# Patient Record
Sex: FEMALE | Race: WHITE | Employment: OTHER | URBAN - NONMETROPOLITAN AREA
[De-identification: names, ages, dates, MRNs, and addresses within clinical notes are randomized per-mention and may not be internally consistent; named-entity substitution may affect disease eponyms.]

---

## 2017-02-10 ENCOUNTER — OFFICE VISIT (OUTPATIENT)
Dept: FAMILY MEDICINE CLINIC | Age: 62
End: 2017-02-10

## 2017-02-10 VITALS
WEIGHT: 293 LBS | TEMPERATURE: 98.7 F | HEIGHT: 68 IN | DIASTOLIC BLOOD PRESSURE: 88 MMHG | SYSTOLIC BLOOD PRESSURE: 152 MMHG | HEART RATE: 104 BPM | BODY MASS INDEX: 44.41 KG/M2

## 2017-02-10 DIAGNOSIS — R42 VERTIGO: Primary | ICD-10-CM

## 2017-02-10 PROCEDURE — 3014F SCREEN MAMMO DOC REV: CPT | Performed by: FAMILY MEDICINE

## 2017-02-10 PROCEDURE — 3017F COLORECTAL CA SCREEN DOC REV: CPT | Performed by: FAMILY MEDICINE

## 2017-02-10 PROCEDURE — G8427 DOCREV CUR MEDS BY ELIG CLIN: HCPCS | Performed by: FAMILY MEDICINE

## 2017-02-10 PROCEDURE — 1036F TOBACCO NON-USER: CPT | Performed by: FAMILY MEDICINE

## 2017-02-10 PROCEDURE — 99213 OFFICE O/P EST LOW 20 MIN: CPT | Performed by: FAMILY MEDICINE

## 2017-02-10 PROCEDURE — G8417 CALC BMI ABV UP PARAM F/U: HCPCS | Performed by: FAMILY MEDICINE

## 2017-02-10 PROCEDURE — G8484 FLU IMMUNIZE NO ADMIN: HCPCS | Performed by: FAMILY MEDICINE

## 2017-02-10 RX ORDER — CEFACLOR 500 MG
500 CAPSULE ORAL 2 TIMES DAILY
Qty: 20 CAPSULE | Refills: 0 | Status: SHIPPED | OUTPATIENT
Start: 2017-02-10 | End: 2017-02-20

## 2017-02-10 RX ORDER — MECLIZINE HCL 12.5 MG/1
12.5 TABLET ORAL 3 TIMES DAILY PRN
Qty: 30 TABLET | Refills: 0 | Status: SHIPPED | OUTPATIENT
Start: 2017-02-10 | End: 2017-11-16

## 2017-06-09 ENCOUNTER — OFFICE VISIT (OUTPATIENT)
Dept: FAMILY MEDICINE CLINIC | Age: 62
End: 2017-06-09

## 2017-06-09 VITALS
WEIGHT: 293 LBS | BODY MASS INDEX: 44.41 KG/M2 | HEART RATE: 72 BPM | SYSTOLIC BLOOD PRESSURE: 136 MMHG | HEIGHT: 68 IN | DIASTOLIC BLOOD PRESSURE: 88 MMHG

## 2017-06-09 DIAGNOSIS — I10 ESSENTIAL HYPERTENSION: ICD-10-CM

## 2017-06-09 PROCEDURE — 99213 OFFICE O/P EST LOW 20 MIN: CPT | Performed by: FAMILY MEDICINE

## 2017-06-09 PROCEDURE — G8417 CALC BMI ABV UP PARAM F/U: HCPCS | Performed by: FAMILY MEDICINE

## 2017-06-09 PROCEDURE — G8427 DOCREV CUR MEDS BY ELIG CLIN: HCPCS | Performed by: FAMILY MEDICINE

## 2017-06-09 PROCEDURE — 3014F SCREEN MAMMO DOC REV: CPT | Performed by: FAMILY MEDICINE

## 2017-06-09 PROCEDURE — 3017F COLORECTAL CA SCREEN DOC REV: CPT | Performed by: FAMILY MEDICINE

## 2017-06-09 PROCEDURE — 1036F TOBACCO NON-USER: CPT | Performed by: FAMILY MEDICINE

## 2017-06-09 RX ORDER — LISINOPRIL AND HYDROCHLOROTHIAZIDE 12.5; 1 MG/1; MG/1
1 TABLET ORAL DAILY
Qty: 90 TABLET | Refills: 1 | Status: SHIPPED | OUTPATIENT
Start: 2017-06-09 | End: 2017-12-08 | Stop reason: DRUGHIGH

## 2017-06-09 ASSESSMENT — ENCOUNTER SYMPTOMS
RESPIRATORY NEGATIVE: 1
GASTROINTESTINAL NEGATIVE: 1
ORTHOPNEA: 0
SHORTNESS OF BREATH: 0

## 2017-11-16 ENCOUNTER — OFFICE VISIT (OUTPATIENT)
Dept: FAMILY MEDICINE CLINIC | Age: 62
End: 2017-11-16
Payer: COMMERCIAL

## 2017-11-16 VITALS
DIASTOLIC BLOOD PRESSURE: 110 MMHG | WEIGHT: 293 LBS | HEIGHT: 69 IN | SYSTOLIC BLOOD PRESSURE: 150 MMHG | BODY MASS INDEX: 43.4 KG/M2 | HEART RATE: 74 BPM

## 2017-11-16 DIAGNOSIS — G50.0 TRIGEMINAL NEURALGIA: Primary | ICD-10-CM

## 2017-11-16 PROCEDURE — 3017F COLORECTAL CA SCREEN DOC REV: CPT | Performed by: FAMILY MEDICINE

## 2017-11-16 PROCEDURE — 1036F TOBACCO NON-USER: CPT | Performed by: FAMILY MEDICINE

## 2017-11-16 PROCEDURE — 99213 OFFICE O/P EST LOW 20 MIN: CPT | Performed by: FAMILY MEDICINE

## 2017-11-16 PROCEDURE — G8427 DOCREV CUR MEDS BY ELIG CLIN: HCPCS | Performed by: FAMILY MEDICINE

## 2017-11-16 PROCEDURE — G8417 CALC BMI ABV UP PARAM F/U: HCPCS | Performed by: FAMILY MEDICINE

## 2017-11-16 PROCEDURE — 3014F SCREEN MAMMO DOC REV: CPT | Performed by: FAMILY MEDICINE

## 2017-11-16 PROCEDURE — G8484 FLU IMMUNIZE NO ADMIN: HCPCS | Performed by: FAMILY MEDICINE

## 2017-11-17 NOTE — PROGRESS NOTES
Name:  Denae Slater  :    1955    Chief Complaint   Patient presents with   Pettis Mustache     left side and down into neck area        HPI:  Unusual left ear pain and mostly into lower face. Sharp to [employer]. No rash. Tender spot behind ear. No head congestion or sore throat. At times feels pain into lower left teeth. Physical Exam:      BP (!) 150/110 (Site: Left Arm, Position: Sitting, Cuff Size: Large Adult)   Pulse 74   Ht 5' 9\" (1.753 m)   Wt (!) 302 lb (137 kg)   BMI 44.60 kg/m²     Not ill. No pain now. ENT is clear. No rash. Vague left facial tenderness. Assessment/Plan:      Likely trigeminal neuralgia. Advil. Heat/cold. Consider Tegretol. Carolina Anderson was seen today for otalgia.     Diagnoses and all orders for this visit:    Trigeminal neuralgia

## 2017-12-08 ENCOUNTER — OFFICE VISIT (OUTPATIENT)
Dept: FAMILY MEDICINE CLINIC | Age: 62
End: 2017-12-08
Payer: COMMERCIAL

## 2017-12-08 VITALS
HEIGHT: 69 IN | BODY MASS INDEX: 42.89 KG/M2 | WEIGHT: 289.6 LBS | SYSTOLIC BLOOD PRESSURE: 144 MMHG | HEART RATE: 88 BPM | DIASTOLIC BLOOD PRESSURE: 96 MMHG

## 2017-12-08 DIAGNOSIS — I10 ESSENTIAL HYPERTENSION: ICD-10-CM

## 2017-12-08 PROCEDURE — 99213 OFFICE O/P EST LOW 20 MIN: CPT | Performed by: FAMILY MEDICINE

## 2017-12-08 PROCEDURE — 1036F TOBACCO NON-USER: CPT | Performed by: FAMILY MEDICINE

## 2017-12-08 PROCEDURE — G8417 CALC BMI ABV UP PARAM F/U: HCPCS | Performed by: FAMILY MEDICINE

## 2017-12-08 PROCEDURE — G8427 DOCREV CUR MEDS BY ELIG CLIN: HCPCS | Performed by: FAMILY MEDICINE

## 2017-12-08 PROCEDURE — G8484 FLU IMMUNIZE NO ADMIN: HCPCS | Performed by: FAMILY MEDICINE

## 2017-12-08 PROCEDURE — 3017F COLORECTAL CA SCREEN DOC REV: CPT | Performed by: FAMILY MEDICINE

## 2017-12-08 PROCEDURE — 3014F SCREEN MAMMO DOC REV: CPT | Performed by: FAMILY MEDICINE

## 2017-12-08 RX ORDER — DICLOFENAC SODIUM 75 MG/1
75 TABLET, DELAYED RELEASE ORAL 2 TIMES DAILY
Qty: 180 TABLET | Refills: 1 | Status: SHIPPED | OUTPATIENT
Start: 2017-12-08 | End: 2018-12-04 | Stop reason: SDUPTHER

## 2017-12-08 RX ORDER — LISINOPRIL AND HYDROCHLOROTHIAZIDE 20; 12.5 MG/1; MG/1
1 TABLET ORAL DAILY
Qty: 90 TABLET | Refills: 1 | Status: SHIPPED | OUTPATIENT
Start: 2017-12-08 | End: 2018-05-19 | Stop reason: SDUPTHER

## 2017-12-08 ASSESSMENT — ENCOUNTER SYMPTOMS
RESPIRATORY NEGATIVE: 1
SHORTNESS OF BREATH: 0
GASTROINTESTINAL NEGATIVE: 1
ORTHOPNEA: 0

## 2017-12-08 ASSESSMENT — PATIENT HEALTH QUESTIONNAIRE - PHQ9
2. FEELING DOWN, DEPRESSED OR HOPELESS: 0
1. LITTLE INTEREST OR PLEASURE IN DOING THINGS: 0
SUM OF ALL RESPONSES TO PHQ9 QUESTIONS 1 & 2: 0
SUM OF ALL RESPONSES TO PHQ QUESTIONS 1-9: 0

## 2017-12-08 NOTE — PROGRESS NOTES
[facility] PROFESSIONAL SERVS  [facility]  [address] [facility] [address]  Dept: [phone]  Dept Fax: [phone]  Loc: [phone]    Visit Date: 12/8/2017    Abdoulaye Recinos is a 58 y.o. female who presents today for:   Chief Complaint   Patient presents with    6 Month Follow-Up    Hypertension         HPI:     Had clean colonoscopy this AM.  No complications   BP runs a little high overall. No symptoms. Hypertension   This is a chronic problem. The current episode started more than 1 year ago. The problem has been resolved since onset. The problem is controlled. Pertinent negatives include no chest pain, orthopnea, palpitations or shortness of breath. Agents associated with hypertension include NSAIDs. Risk factors for coronary artery disease include obesity and post-menopausal state. Past treatments include ACE inhibitors and diuretics. The current treatment provides significant improvement. There are no compliance problems. There is no history of kidney disease, CAD/MI, CVA or a thyroid problem. There is no history of chronic renal disease. Current Outpatient Prescriptions   Medication Sig Dispense Refill    diclofenac (VOLTAREN) 75 MG EC tablet Take 1 tablet by mouth 2 times daily 180 tablet 1    lisinopril-hydrochlorothiazide (PRINZIDE;ZESTORETIC) 20-12.5 MG per tablet Take 1 tablet by mouth daily 90 tablet 1    Multiple Vitamins-Minerals (WOMENS MULTI VITAMIN & MINERAL PO) Take 1 tablet by mouth daily.  Calcium Polycarbophil (FIBERCON PO) Take 1 tablet by mouth 2 times daily.  Omeprazole Magnesium (PRILOSEC OTC PO) Take 1 tablet by mouth daily. No current facility-administered medications for this visit. The patient is allergic to amoxicillin. Subjective:      Review of Systems   Constitutional: Negative. Negative for activity change and appetite change. HENT: Negative. Respiratory: Negative.   Negative for shortness

## 2018-01-31 ENCOUNTER — HOSPITAL ENCOUNTER (OUTPATIENT)
Dept: MAMMOGRAPHY | Age: 63
Discharge: HOME | End: 2018-01-31
Payer: COMMERCIAL

## 2018-01-31 DIAGNOSIS — Z12.39 SCREENING FOR BREAST CANCER: ICD-10-CM

## 2018-01-31 PROCEDURE — 77067 SCR MAMMO BI INCL CAD: CPT

## 2018-05-19 DIAGNOSIS — I10 ESSENTIAL HYPERTENSION: ICD-10-CM

## 2018-05-21 RX ORDER — LISINOPRIL AND HYDROCHLOROTHIAZIDE 20; 12.5 MG/1; MG/1
TABLET ORAL
Qty: 90 TABLET | Refills: 1 | Status: SHIPPED | OUTPATIENT
Start: 2018-05-21 | End: 2018-12-04 | Stop reason: SDUPTHER

## 2018-06-07 ENCOUNTER — OFFICE VISIT (OUTPATIENT)
Dept: FAMILY MEDICINE CLINIC | Age: 63
End: 2018-06-07
Payer: COMMERCIAL

## 2018-06-07 VITALS
BODY MASS INDEX: 43.4 KG/M2 | HEART RATE: 80 BPM | WEIGHT: 293 LBS | SYSTOLIC BLOOD PRESSURE: 136 MMHG | HEIGHT: 69 IN | DIASTOLIC BLOOD PRESSURE: 88 MMHG

## 2018-06-07 DIAGNOSIS — I10 ESSENTIAL HYPERTENSION: Primary | ICD-10-CM

## 2018-06-07 PROCEDURE — 3017F COLORECTAL CA SCREEN DOC REV: CPT | Performed by: FAMILY MEDICINE

## 2018-06-07 PROCEDURE — G8427 DOCREV CUR MEDS BY ELIG CLIN: HCPCS | Performed by: FAMILY MEDICINE

## 2018-06-07 PROCEDURE — 1036F TOBACCO NON-USER: CPT | Performed by: FAMILY MEDICINE

## 2018-06-07 PROCEDURE — 99213 OFFICE O/P EST LOW 20 MIN: CPT | Performed by: FAMILY MEDICINE

## 2018-06-07 PROCEDURE — G8417 CALC BMI ABV UP PARAM F/U: HCPCS | Performed by: FAMILY MEDICINE

## 2018-06-07 ASSESSMENT — ENCOUNTER SYMPTOMS
ORTHOPNEA: 0
GASTROINTESTINAL NEGATIVE: 1
RESPIRATORY NEGATIVE: 1

## 2018-10-26 ENCOUNTER — NURSE ONLY (OUTPATIENT)
Dept: FAMILY MEDICINE CLINIC | Age: 63
End: 2018-10-26
Payer: COMMERCIAL

## 2018-10-26 DIAGNOSIS — Z23 NEED FOR PROPHYLACTIC VACCINATION AND INOCULATION AGAINST INFLUENZA: Primary | ICD-10-CM

## 2018-10-26 PROCEDURE — 90471 IMMUNIZATION ADMIN: CPT | Performed by: FAMILY MEDICINE

## 2018-10-26 PROCEDURE — 90688 IIV4 VACCINE SPLT 0.5 ML IM: CPT | Performed by: FAMILY MEDICINE

## 2018-12-04 ENCOUNTER — OFFICE VISIT (OUTPATIENT)
Dept: FAMILY MEDICINE CLINIC | Age: 63
End: 2018-12-04
Payer: COMMERCIAL

## 2018-12-04 VITALS
HEART RATE: 72 BPM | HEIGHT: 69 IN | BODY MASS INDEX: 43.4 KG/M2 | SYSTOLIC BLOOD PRESSURE: 138 MMHG | DIASTOLIC BLOOD PRESSURE: 82 MMHG | WEIGHT: 293 LBS

## 2018-12-04 DIAGNOSIS — I10 ESSENTIAL HYPERTENSION: ICD-10-CM

## 2018-12-04 PROCEDURE — G8417 CALC BMI ABV UP PARAM F/U: HCPCS | Performed by: FAMILY MEDICINE

## 2018-12-04 PROCEDURE — 1036F TOBACCO NON-USER: CPT | Performed by: FAMILY MEDICINE

## 2018-12-04 PROCEDURE — 3017F COLORECTAL CA SCREEN DOC REV: CPT | Performed by: FAMILY MEDICINE

## 2018-12-04 PROCEDURE — G8482 FLU IMMUNIZE ORDER/ADMIN: HCPCS | Performed by: FAMILY MEDICINE

## 2018-12-04 PROCEDURE — G8427 DOCREV CUR MEDS BY ELIG CLIN: HCPCS | Performed by: FAMILY MEDICINE

## 2018-12-04 PROCEDURE — 99213 OFFICE O/P EST LOW 20 MIN: CPT | Performed by: FAMILY MEDICINE

## 2018-12-04 RX ORDER — DICLOFENAC SODIUM 75 MG/1
75 TABLET, DELAYED RELEASE ORAL 2 TIMES DAILY
Qty: 180 TABLET | Refills: 1 | Status: SHIPPED | OUTPATIENT
Start: 2018-12-04 | End: 2019-06-03 | Stop reason: SDUPTHER

## 2018-12-04 RX ORDER — LISINOPRIL AND HYDROCHLOROTHIAZIDE 20; 12.5 MG/1; MG/1
TABLET ORAL
Qty: 90 TABLET | Refills: 1 | Status: SHIPPED | OUTPATIENT
Start: 2018-12-04 | End: 2019-06-03 | Stop reason: DRUGHIGH

## 2018-12-04 ASSESSMENT — PATIENT HEALTH QUESTIONNAIRE - PHQ9
SUM OF ALL RESPONSES TO PHQ QUESTIONS 1-9: 0
SUM OF ALL RESPONSES TO PHQ9 QUESTIONS 1 & 2: 0
1. LITTLE INTEREST OR PLEASURE IN DOING THINGS: 0
SUM OF ALL RESPONSES TO PHQ QUESTIONS 1-9: 0
2. FEELING DOWN, DEPRESSED OR HOPELESS: 0

## 2018-12-05 ASSESSMENT — ENCOUNTER SYMPTOMS
RESPIRATORY NEGATIVE: 1
SHORTNESS OF BREATH: 0
ABDOMINAL PAIN: 0
GASTROINTESTINAL NEGATIVE: 1
ORTHOPNEA: 0

## 2019-03-08 ENCOUNTER — HOSPITAL ENCOUNTER (OUTPATIENT)
Dept: MAMMOGRAPHY | Age: 64
Discharge: HOME | End: 2019-03-08
Payer: COMMERCIAL

## 2019-03-08 DIAGNOSIS — Z12.39 BREAST CANCER SCREENING: ICD-10-CM

## 2019-03-08 PROCEDURE — 77063 BREAST TOMOSYNTHESIS BI: CPT

## 2019-03-15 ENCOUNTER — OFFICE VISIT (OUTPATIENT)
Dept: FAMILY MEDICINE CLINIC | Age: 64
End: 2019-03-15
Payer: COMMERCIAL

## 2019-03-15 VITALS
SYSTOLIC BLOOD PRESSURE: 132 MMHG | WEIGHT: 293 LBS | DIASTOLIC BLOOD PRESSURE: 84 MMHG | BODY MASS INDEX: 43.4 KG/M2 | HEIGHT: 69 IN | HEART RATE: 78 BPM

## 2019-03-15 DIAGNOSIS — M72.2 PLANTAR FASCIITIS, RIGHT: Primary | ICD-10-CM

## 2019-03-15 PROCEDURE — G8482 FLU IMMUNIZE ORDER/ADMIN: HCPCS | Performed by: FAMILY MEDICINE

## 2019-03-15 PROCEDURE — G8427 DOCREV CUR MEDS BY ELIG CLIN: HCPCS | Performed by: FAMILY MEDICINE

## 2019-03-15 PROCEDURE — G8417 CALC BMI ABV UP PARAM F/U: HCPCS | Performed by: FAMILY MEDICINE

## 2019-03-15 PROCEDURE — 3017F COLORECTAL CA SCREEN DOC REV: CPT | Performed by: FAMILY MEDICINE

## 2019-03-15 PROCEDURE — 1036F TOBACCO NON-USER: CPT | Performed by: FAMILY MEDICINE

## 2019-03-15 PROCEDURE — 99213 OFFICE O/P EST LOW 20 MIN: CPT | Performed by: FAMILY MEDICINE

## 2019-03-15 RX ORDER — PREDNISONE 10 MG/1
20 TABLET ORAL 2 TIMES DAILY
Qty: 28 TABLET | Refills: 0 | Status: SHIPPED | OUTPATIENT
Start: 2019-03-15 | End: 2019-03-22

## 2019-03-15 ASSESSMENT — PATIENT HEALTH QUESTIONNAIRE - PHQ9
2. FEELING DOWN, DEPRESSED OR HOPELESS: 0
SUM OF ALL RESPONSES TO PHQ QUESTIONS 1-9: 0
SUM OF ALL RESPONSES TO PHQ QUESTIONS 1-9: 0
1. LITTLE INTEREST OR PLEASURE IN DOING THINGS: 0
SUM OF ALL RESPONSES TO PHQ9 QUESTIONS 1 & 2: 0

## 2019-06-03 ENCOUNTER — OFFICE VISIT (OUTPATIENT)
Dept: FAMILY MEDICINE CLINIC | Age: 64
End: 2019-06-03
Payer: COMMERCIAL

## 2019-06-03 VITALS
SYSTOLIC BLOOD PRESSURE: 138 MMHG | DIASTOLIC BLOOD PRESSURE: 82 MMHG | HEIGHT: 69 IN | WEIGHT: 293 LBS | BODY MASS INDEX: 43.4 KG/M2 | HEART RATE: 84 BPM

## 2019-06-03 DIAGNOSIS — I10 ESSENTIAL HYPERTENSION: ICD-10-CM

## 2019-06-03 DIAGNOSIS — E78.00 HYPERCHOLESTEREMIA: Chronic | ICD-10-CM

## 2019-06-03 PROCEDURE — G8417 CALC BMI ABV UP PARAM F/U: HCPCS | Performed by: FAMILY MEDICINE

## 2019-06-03 PROCEDURE — G8427 DOCREV CUR MEDS BY ELIG CLIN: HCPCS | Performed by: FAMILY MEDICINE

## 2019-06-03 PROCEDURE — 1036F TOBACCO NON-USER: CPT | Performed by: FAMILY MEDICINE

## 2019-06-03 PROCEDURE — 99213 OFFICE O/P EST LOW 20 MIN: CPT | Performed by: FAMILY MEDICINE

## 2019-06-03 PROCEDURE — 3017F COLORECTAL CA SCREEN DOC REV: CPT | Performed by: FAMILY MEDICINE

## 2019-06-03 RX ORDER — PRAVASTATIN SODIUM 20 MG
20 TABLET ORAL DAILY
Qty: 90 TABLET | Refills: 1 | Status: SHIPPED | OUTPATIENT
Start: 2019-06-03 | End: 2019-08-15 | Stop reason: SDUPTHER

## 2019-06-03 RX ORDER — MELOXICAM 15 MG/1
15 TABLET ORAL DAILY
Qty: 30 TABLET | Refills: 3 | Status: SHIPPED | OUTPATIENT
Start: 2019-06-03 | End: 2019-11-26 | Stop reason: ALTCHOICE

## 2019-06-03 RX ORDER — LISINOPRIL AND HYDROCHLOROTHIAZIDE 25; 20 MG/1; MG/1
1 TABLET ORAL DAILY
Qty: 90 TABLET | Refills: 1 | Status: SHIPPED | OUTPATIENT
Start: 2019-06-03 | End: 2019-11-26 | Stop reason: SDUPTHER

## 2019-06-03 RX ORDER — DICLOFENAC SODIUM 75 MG/1
75 TABLET, DELAYED RELEASE ORAL 2 TIMES DAILY
Qty: 180 TABLET | Refills: 1 | Status: SHIPPED | OUTPATIENT
Start: 2019-06-03 | End: 2019-06-04 | Stop reason: CLARIF

## 2019-06-03 ASSESSMENT — ENCOUNTER SYMPTOMS
RESPIRATORY NEGATIVE: 1
GASTROINTESTINAL NEGATIVE: 1
ORTHOPNEA: 0

## 2019-06-03 ASSESSMENT — PATIENT HEALTH QUESTIONNAIRE - PHQ9
SUM OF ALL RESPONSES TO PHQ9 QUESTIONS 1 & 2: 0
SUM OF ALL RESPONSES TO PHQ QUESTIONS 1-9: 0
2. FEELING DOWN, DEPRESSED OR HOPELESS: 0
SUM OF ALL RESPONSES TO PHQ QUESTIONS 1-9: 0
1. LITTLE INTEREST OR PLEASURE IN DOING THINGS: 0

## 2019-06-03 NOTE — PATIENT INSTRUCTIONS
You may receive a survey regarding the care you received during your visit. Your input is valuable to us. We encourage you to complete and return your survey. We hope you will choose us in the future for your healthcare needs. Patient Education        Learning About High Blood Pressure  What is high blood pressure? Blood pressure is a measure of how hard the blood pushes against the walls of your arteries. It's normal for blood pressure to go up and down throughout the day, but if it stays up, you have high blood pressure. Another name for high blood pressure is hypertension. Two numbers tell you your blood pressure. The first number is the systolic pressure. It shows how hard the blood pushes when your heart is pumping. The second number is the diastolic pressure. It shows how hard the blood pushes between heartbeats, when your heart is relaxed and filling with blood. Your doctor will give you a goal for your blood pressure. Your goal will be based on your health and your age. High blood pressure (hypertension) means that the top number stays high, or the bottom number stays high, or both. High blood pressure increases the risk of stroke, heart attack, and other problems. You and your doctor will talk about your risks of these problems based on your blood pressure. What happens when you have high blood pressure? · Blood flows through your arteries with too much force. Over time, this damages the walls of your arteries. But you can't feel it. High blood pressure usually doesn't cause symptoms. · Fat and calcium start to build up in your arteries. This buildup is called plaque. Plaque makes your arteries narrower and stiffer. Blood can't flow through them as easily. · This lack of good blood flow starts to damage some of the organs in your body. This can lead to problems such as coronary artery disease and heart attack, heart failure, stroke, kidney failure, and eye damage.   How can you prevent high blood pressure? · Stay at a healthy weight. · Try to limit how much sodium you eat to less than 2,300 milligrams (mg) a day. If you limit your sodium to 1,500 mg a day, you can lower your blood pressure even more. ? Buy foods that are labeled \"unsalted,\" \"sodium-free,\" or \"low-sodium. \" Foods labeled \"reduced-sodium\" and \"light sodium\" may still have too much sodium. ? Flavor your food with garlic, lemon juice, onion, vinegar, herbs, and spices instead of salt. Do not use soy sauce, steak sauce, onion salt, garlic salt, mustard, or ketchup on your food. ? Use less salt (or none) when recipes call for it. You can often use half the salt a recipe calls for without losing flavor. · Be physically active. Get at least 30 minutes of exercise on most days of the week. Walking is a good choice. You also may want to do other activities, such as running, swimming, cycling, or playing tennis or team sports. · Limit alcohol to 2 drinks a day for men and 1 drink a day for women. · Eat plenty of fruits, vegetables, and low-fat dairy products. Eat less saturated and total fats. How is high blood pressure treated? · Your doctor will suggest making lifestyle changes to help your heart. For example, your doctor may ask you to eat healthy foods, quit smoking, lose extra weight, and be more active. · If lifestyle changes don't help enough, your doctor may recommend that you take medicine. · When blood pressure is very high, medicines are needed to lower it. Follow-up care is a key part of your treatment and safety. Be sure to make and go to all appointments, and call your doctor if you are having problems. It's also a good idea to know your test results and keep a list of the medicines you take. Where can you learn more? Go to [email or web address] org and sign in to your [employer] account. Enter P501 in the [employer] box to learn more about \"Learning About High Blood Pressure. \"     If you do not have an account, please click on the \"Sign Up Now\" link. Current as of: July 22, 2018  Content Version: 12.0  © 1971-9565 Healthwise, Incorporated. Care instructions adapted under license by [facility] ([facility]). If you have questions about a medical condition or this instruction, always ask your healthcare professional. [address] any warranty or liability for your use of this information.

## 2019-06-04 ENCOUNTER — TELEPHONE (OUTPATIENT)
Dept: FAMILY MEDICINE CLINIC | Age: 64
End: 2019-06-04

## 2019-06-04 NOTE — TELEPHONE ENCOUNTER
It was noted that Dr Harding had started Jo on Meloxicam yesterday. She has been on Voltaren. Dr Lugo Or ordered to stop the Voltaren for now, try the Mobic for 30 days and call us to let him know how it is working. Jaxon Rapp was notified.

## 2019-06-04 NOTE — PROGRESS NOTES
[facility]  [address] [address]  Dept: [phone]  Dept Fax: [phone]: [phone]    Visit Date: 6/3/2019    Jinny Childers is a 59 y. o.female who presents today for:   Chief Complaint   Patient presents with    6 Month Follow-Up    Hypertension         HPI:      Hypertension   This is a chronic problem. The current episode started more than 1 year ago. The problem has been resolved since onset. The problem is controlled. Pertinent negatives include no chest pain, orthopnea or palpitations. Risk factors for coronary artery disease include family history, obesity and post-menopausal state. Past treatments include ACE inhibitors and diuretics. The current treatment provides moderate improvement. There are no compliance problems. There is no history of kidney disease, CAD/MI or CVA. There is no history of chronic renal disease or a thyroid problem. Current Outpatient Medications   Medication Sig Dispense Refill    diclofenac (VOLTAREN) 75 MG EC tablet Take 1 tablet by mouth 2 times daily 180 tablet 1    pravastatin (PRAVACHOL) 20 MG tablet Take 1 tablet by mouth daily 90 tablet 1    lisinopril-hydrochlorothiazide (PRINZIDE;ZESTORETIC) 20-25 MG per tablet Take 1 tablet by mouth daily 90 tablet 1    meloxicam (MOBIC) 15 MG tablet Take 1 tablet by mouth daily 30 tablet 3    Multiple Vitamins-Minerals (WOMENS MULTI VITAMIN & MINERAL PO) Take 1 tablet by mouth daily.  Calcium Polycarbophil (FIBERCON PO) Take 1 tablet by mouth 2 times daily.  Omeprazole Magnesium (PRILOSEC OTC PO) Take 1 tablet by mouth daily. No current facility-administered medications for this visit. The patient is allergic to amoxicillin. Subjective:      Review of Systems   Constitutional: Negative. HENT: Negative. Respiratory: Negative. Cardiovascular: Negative.   Negative for chest pain, palpitations and orthopnea. Gastrointestinal: Negative. Genitourinary: Negative. Musculoskeletal: Negative. Skin: Negative. Neurological: Negative. Hematological: Negative. Psychiatric/Behavioral: Negative. Objective:     /82   Pulse 84   Ht 5' 9\" (1.753 m)   Wt (!) 300 lb 12.8 oz (136.4 kg)   BMI 44.42 kg/m²     Physical Exam   Constitutional: She is oriented to person, place, and time. She appears well-developed and well-nourished. Neck: Normal range of motion. Neck supple. No thyromegaly present. Cardiovascular: Normal rate, regular rhythm, normal heart sounds and intact distal pulses. Exam reveals no gallop and no friction rub. No murmur heard. Pulmonary/Chest: Effort normal and breath sounds normal.   Abdominal: Soft. She exhibits no mass. There is no splenomegaly or hepatomegaly. There is no tenderness. Musculoskeletal: She exhibits no edema or tenderness. Lymphadenopathy:     She has no cervical adenopathy. Neurological: She is alert and oriented to person, place, and time. Ambulatory. No tremors. Skin: Skin is warm and dry. No rash noted. Psychiatric: She has a normal mood and affect. Vitals reviewed. See recent labs showing rising lipids. Assessment:       Diagnosis Orders   1. Essential hypertension     2. Hypercholesteremia         Plan:    Start Statin     No follow-ups on file. Orders Placed:  No orders of the defined types were placed in this encounter.     Medications Prescribed:  Orders Placed This Encounter   Medications    diclofenac (VOLTAREN) 75 MG EC tablet     Sig: Take 1 tablet by mouth 2 times daily     Dispense:  180 tablet     Refill:  1    pravastatin (PRAVACHOL) 20 MG tablet     Sig: Take 1 tablet by mouth daily     Dispense:  90 tablet     Refill:  1    lisinopril-hydrochlorothiazide (PRINZIDE;ZESTORETIC) 20-25 MG per tablet     Sig: Take 1 tablet by mouth daily     Dispense:  90 tablet     Refill:  1    meloxicam (MOBIC) 15 MG tablet     Sig: Take 1 tablet by mouth daily     Dispense:  30 tablet     Refill:  3         Electronically signed by Kathy Vanegas 6/3/2019 at 9:22 PM      As I was leaving Kelly Pantoja talked about more arthritis of left knee with Baker's cyst.   We decided to try Mobic. She whould hold the Voltaren.     Primo Eisenberg

## 2019-06-07 ENCOUNTER — NURSE ONLY (OUTPATIENT)
Dept: FAMILY MEDICINE CLINIC | Age: 64
End: 2019-06-07
Payer: COMMERCIAL

## 2019-06-07 ENCOUNTER — TELEPHONE (OUTPATIENT)
Dept: FAMILY MEDICINE CLINIC | Age: 64
End: 2019-06-07

## 2019-06-07 DIAGNOSIS — M19.90 OSTEOARTHRITIS, UNSPECIFIED OSTEOARTHRITIS TYPE, UNSPECIFIED SITE: Primary | ICD-10-CM

## 2019-06-07 PROCEDURE — 96372 THER/PROPH/DIAG INJ SC/IM: CPT | Performed by: FAMILY MEDICINE

## 2019-06-07 RX ORDER — METHYLPREDNISOLONE ACETATE 80 MG/ML
80 INJECTION, SUSPENSION INTRA-ARTICULAR; INTRALESIONAL; INTRAMUSCULAR; SOFT TISSUE ONCE
Status: COMPLETED | OUTPATIENT
Start: 2019-06-07 | End: 2019-06-07

## 2019-06-07 RX ADMIN — METHYLPREDNISOLONE ACETATE 80 MG: 80 INJECTION, SUSPENSION INTRA-ARTICULAR; INTRALESIONAL; INTRAMUSCULAR; SOFT TISSUE at 10:54

## 2019-06-07 NOTE — TELEPHONE ENCOUNTER
Elisha Strange called in stating that she was seen on Monday and started on Meloxicam 15 mg. She was supposed to call back in to let you know how it was going. She states that it \" has not worked at all yet\". She wonders if she should give it some more time of if there is anything else to be done.

## 2019-06-07 NOTE — TELEPHONE ENCOUNTER
Informed patient to continue with the Mobic and come up for a depo medrol injection. Patient will be up for injection.

## 2019-06-28 ENCOUNTER — TELEPHONE (OUTPATIENT)
Dept: FAMILY MEDICINE CLINIC | Age: 64
End: 2019-06-28

## 2019-06-28 DIAGNOSIS — M17.0 PRIMARY OSTEOARTHRITIS OF BOTH KNEES: Primary | ICD-10-CM

## 2019-06-28 NOTE — TELEPHONE ENCOUNTER
Mani Mares continues with bilateral knee pain. Says that she did not feel any difference after receiving the Depo 80 mg injection on 06/07/19. Feels Mobic 15 mg is not helping at all. Wanting to know what else can be done?  Please advise

## 2019-06-28 NOTE — TELEPHONE ENCOUNTER
Spoke with Rebecca Trejo- would like to get x-rays first and then depending on what it shows then proceed with referral. Would like to have done at [facility].

## 2019-07-01 ENCOUNTER — HOSPITAL ENCOUNTER (OUTPATIENT)
Age: 64
Discharge: HOME | End: 2019-07-01
Payer: COMMERCIAL

## 2019-07-01 ENCOUNTER — HOSPITAL ENCOUNTER (OUTPATIENT)
Dept: GENERAL RADIOLOGY | Age: 64
Discharge: HOME | End: 2019-07-01
Payer: COMMERCIAL

## 2019-07-01 ENCOUNTER — TELEPHONE (OUTPATIENT)
Dept: FAMILY MEDICINE CLINIC | Age: 64
End: 2019-07-01

## 2019-07-01 DIAGNOSIS — M17.0 PRIMARY OSTEOARTHRITIS OF BOTH KNEES: ICD-10-CM

## 2019-07-01 DIAGNOSIS — M19.90 OSTEOARTHRITIS, UNSPECIFIED OSTEOARTHRITIS TYPE, UNSPECIFIED SITE: Primary | ICD-10-CM

## 2019-07-01 PROCEDURE — 73564 X-RAY EXAM KNEE 4 OR MORE: CPT

## 2019-08-15 DIAGNOSIS — I10 ESSENTIAL HYPERTENSION: ICD-10-CM

## 2019-08-15 DIAGNOSIS — E78.00 HYPERCHOLESTEREMIA: Chronic | ICD-10-CM

## 2019-08-15 RX ORDER — PRAVASTATIN SODIUM 20 MG
20 TABLET ORAL DAILY
Qty: 90 TABLET | Refills: 1 | Status: SHIPPED | OUTPATIENT
Start: 2019-08-15 | End: 2019-11-26 | Stop reason: SDUPTHER

## 2019-11-26 ENCOUNTER — OFFICE VISIT (OUTPATIENT)
Dept: FAMILY MEDICINE CLINIC | Age: 64
End: 2019-11-26
Payer: COMMERCIAL

## 2019-11-26 VITALS
BODY MASS INDEX: 43.4 KG/M2 | TEMPERATURE: 97.9 F | HEIGHT: 69 IN | DIASTOLIC BLOOD PRESSURE: 82 MMHG | WEIGHT: 293 LBS | HEART RATE: 76 BPM | SYSTOLIC BLOOD PRESSURE: 134 MMHG

## 2019-11-26 DIAGNOSIS — E78.00 HYPERCHOLESTEREMIA: Chronic | ICD-10-CM

## 2019-11-26 DIAGNOSIS — B96.89 ACUTE BACTERIAL SINUSITIS: ICD-10-CM

## 2019-11-26 DIAGNOSIS — I10 ESSENTIAL HYPERTENSION: Primary | ICD-10-CM

## 2019-11-26 DIAGNOSIS — M17.0 PRIMARY OSTEOARTHRITIS OF BOTH KNEES: ICD-10-CM

## 2019-11-26 DIAGNOSIS — J01.90 ACUTE BACTERIAL SINUSITIS: ICD-10-CM

## 2019-11-26 PROCEDURE — 1036F TOBACCO NON-USER: CPT | Performed by: FAMILY MEDICINE

## 2019-11-26 PROCEDURE — 3017F COLORECTAL CA SCREEN DOC REV: CPT | Performed by: FAMILY MEDICINE

## 2019-11-26 PROCEDURE — G8417 CALC BMI ABV UP PARAM F/U: HCPCS | Performed by: FAMILY MEDICINE

## 2019-11-26 PROCEDURE — 99213 OFFICE O/P EST LOW 20 MIN: CPT | Performed by: FAMILY MEDICINE

## 2019-11-26 PROCEDURE — G8427 DOCREV CUR MEDS BY ELIG CLIN: HCPCS | Performed by: FAMILY MEDICINE

## 2019-11-26 PROCEDURE — G8484 FLU IMMUNIZE NO ADMIN: HCPCS | Performed by: FAMILY MEDICINE

## 2019-11-26 RX ORDER — LISINOPRIL AND HYDROCHLOROTHIAZIDE 25; 20 MG/1; MG/1
1 TABLET ORAL DAILY
Qty: 90 TABLET | Refills: 3 | Status: SHIPPED | OUTPATIENT
Start: 2019-11-26 | End: 2019-12-09 | Stop reason: SINTOL

## 2019-11-26 RX ORDER — PRAVASTATIN SODIUM 20 MG
20 TABLET ORAL DAILY
Qty: 90 TABLET | Refills: 3 | Status: SHIPPED | OUTPATIENT
Start: 2019-11-26 | End: 2019-12-09 | Stop reason: SINTOL

## 2019-11-26 RX ORDER — CEFDINIR 300 MG/1
300 CAPSULE ORAL 2 TIMES DAILY
Qty: 20 CAPSULE | Refills: 0 | Status: SHIPPED | OUTPATIENT
Start: 2019-11-26 | End: 2019-12-06

## 2019-11-26 RX ORDER — TRAMADOL HYDROCHLORIDE 50 MG/1
50 TABLET ORAL EVERY 6 HOURS PRN
Qty: 28 TABLET | Refills: 1 | Status: SHIPPED | OUTPATIENT
Start: 2019-11-26 | End: 2019-12-03

## 2019-11-26 RX ORDER — IBUPROFEN 600 MG/1
600 TABLET ORAL 3 TIMES DAILY PRN
Qty: 270 TABLET | Refills: 3 | Status: SHIPPED | OUTPATIENT
Start: 2019-11-26 | End: 2021-03-31

## 2019-11-26 ASSESSMENT — ENCOUNTER SYMPTOMS
SINUS PRESSURE: 1
COUGH: 1
GASTROINTESTINAL NEGATIVE: 1
ORTHOPNEA: 0

## 2019-11-29 ENCOUNTER — TELEPHONE (OUTPATIENT)
Dept: FAMILY MEDICINE CLINIC | Age: 64
End: 2019-11-29

## 2019-11-29 DIAGNOSIS — J01.90 ACUTE BACTERIAL SINUSITIS: Primary | ICD-10-CM

## 2019-11-29 DIAGNOSIS — B96.89 ACUTE BACTERIAL SINUSITIS: Primary | ICD-10-CM

## 2019-11-29 RX ORDER — DOXYCYCLINE HYCLATE 100 MG
100 TABLET ORAL 2 TIMES DAILY
Qty: 20 TABLET | Refills: 0 | Status: SHIPPED | OUTPATIENT
Start: 2019-11-29 | End: 2019-12-09 | Stop reason: ALTCHOICE

## 2019-11-29 RX ORDER — PREDNISONE 20 MG/1
20 TABLET ORAL 2 TIMES DAILY
Qty: 10 TABLET | Refills: 0 | Status: SHIPPED | OUTPATIENT
Start: 2019-11-29 | End: 2019-12-04

## 2019-12-09 ENCOUNTER — OFFICE VISIT (OUTPATIENT)
Dept: FAMILY MEDICINE CLINIC | Age: 64
End: 2019-12-09
Payer: COMMERCIAL

## 2019-12-09 VITALS
DIASTOLIC BLOOD PRESSURE: 80 MMHG | HEART RATE: 84 BPM | BODY MASS INDEX: 43.4 KG/M2 | HEIGHT: 69 IN | WEIGHT: 293 LBS | SYSTOLIC BLOOD PRESSURE: 128 MMHG

## 2019-12-09 DIAGNOSIS — L50.9 HIVES: Primary | ICD-10-CM

## 2019-12-09 DIAGNOSIS — M17.12 PRIMARY OSTEOARTHRITIS OF LEFT KNEE: ICD-10-CM

## 2019-12-09 DIAGNOSIS — M19.90 OSTEOARTHRITIS, UNSPECIFIED OSTEOARTHRITIS TYPE, UNSPECIFIED SITE: ICD-10-CM

## 2019-12-09 PROCEDURE — 96372 THER/PROPH/DIAG INJ SC/IM: CPT | Performed by: FAMILY MEDICINE

## 2019-12-09 PROCEDURE — 3017F COLORECTAL CA SCREEN DOC REV: CPT | Performed by: FAMILY MEDICINE

## 2019-12-09 PROCEDURE — G8417 CALC BMI ABV UP PARAM F/U: HCPCS | Performed by: FAMILY MEDICINE

## 2019-12-09 PROCEDURE — G8427 DOCREV CUR MEDS BY ELIG CLIN: HCPCS | Performed by: FAMILY MEDICINE

## 2019-12-09 PROCEDURE — G8484 FLU IMMUNIZE NO ADMIN: HCPCS | Performed by: FAMILY MEDICINE

## 2019-12-09 PROCEDURE — 99213 OFFICE O/P EST LOW 20 MIN: CPT | Performed by: FAMILY MEDICINE

## 2019-12-09 PROCEDURE — 1036F TOBACCO NON-USER: CPT | Performed by: FAMILY MEDICINE

## 2019-12-09 RX ORDER — METHYLPREDNISOLONE ACETATE 80 MG/ML
80 INJECTION, SUSPENSION INTRA-ARTICULAR; INTRALESIONAL; INTRAMUSCULAR; SOFT TISSUE ONCE
Status: COMPLETED | OUTPATIENT
Start: 2019-12-09 | End: 2019-12-09

## 2019-12-09 RX ORDER — METHYLPREDNISOLONE ACETATE 80 MG/ML
80 INJECTION, SUSPENSION INTRA-ARTICULAR; INTRALESIONAL; INTRAMUSCULAR; SOFT TISSUE DAILY
Qty: 1 ML | Refills: 0
Start: 2019-12-09 | End: 2019-12-09 | Stop reason: CLARIF

## 2019-12-09 RX ADMIN — METHYLPREDNISOLONE ACETATE 80 MG: 80 INJECTION, SUSPENSION INTRA-ARTICULAR; INTRALESIONAL; INTRAMUSCULAR; SOFT TISSUE at 13:38

## 2019-12-12 ENCOUNTER — TELEPHONE (OUTPATIENT)
Dept: FAMILY MEDICINE CLINIC | Age: 64
End: 2019-12-12

## 2019-12-12 RX ORDER — LISINOPRIL AND HYDROCHLOROTHIAZIDE 25; 20 MG/1; MG/1
1 TABLET ORAL DAILY
COMMUNITY
End: 2020-09-30 | Stop reason: SDUPTHER

## 2020-01-16 ENCOUNTER — OFFICE VISIT (OUTPATIENT)
Dept: FAMILY MEDICINE CLINIC | Age: 65
End: 2020-01-16
Payer: COMMERCIAL

## 2020-01-16 VITALS
SYSTOLIC BLOOD PRESSURE: 132 MMHG | WEIGHT: 293 LBS | HEIGHT: 69 IN | TEMPERATURE: 98.9 F | DIASTOLIC BLOOD PRESSURE: 70 MMHG | BODY MASS INDEX: 43.4 KG/M2 | HEART RATE: 92 BPM

## 2020-01-16 PROCEDURE — 3017F COLORECTAL CA SCREEN DOC REV: CPT | Performed by: FAMILY MEDICINE

## 2020-01-16 PROCEDURE — 99213 OFFICE O/P EST LOW 20 MIN: CPT | Performed by: FAMILY MEDICINE

## 2020-01-16 PROCEDURE — G8417 CALC BMI ABV UP PARAM F/U: HCPCS | Performed by: FAMILY MEDICINE

## 2020-01-16 PROCEDURE — G8484 FLU IMMUNIZE NO ADMIN: HCPCS | Performed by: FAMILY MEDICINE

## 2020-01-16 PROCEDURE — G8427 DOCREV CUR MEDS BY ELIG CLIN: HCPCS | Performed by: FAMILY MEDICINE

## 2020-01-16 PROCEDURE — 1036F TOBACCO NON-USER: CPT | Performed by: FAMILY MEDICINE

## 2020-01-16 RX ORDER — AZITHROMYCIN 250 MG/1
TABLET, FILM COATED ORAL
Qty: 6 TABLET | Refills: 0 | Status: SHIPPED | OUTPATIENT
Start: 2020-01-16 | End: 2020-03-12 | Stop reason: ALTCHOICE

## 2020-01-16 RX ORDER — DICLOFENAC SODIUM 75 MG/1
75 TABLET, DELAYED RELEASE ORAL 2 TIMES DAILY
Qty: 180 TABLET | Refills: 3 | Status: SHIPPED | OUTPATIENT
Start: 2020-01-16 | End: 2020-09-30

## 2020-01-16 RX ORDER — DICLOFENAC SODIUM 75 MG/1
75 TABLET, DELAYED RELEASE ORAL
COMMUNITY
End: 2020-01-16 | Stop reason: SDUPTHER

## 2020-01-16 SDOH — ECONOMIC STABILITY: TRANSPORTATION INSECURITY
IN THE PAST 12 MONTHS, HAS THE LACK OF TRANSPORTATION KEPT YOU FROM MEDICAL APPOINTMENTS OR FROM GETTING MEDICATIONS?: PATIENT DECLINED

## 2020-01-16 SDOH — ECONOMIC STABILITY: FOOD INSECURITY: WITHIN THE PAST 12 MONTHS, YOU WORRIED THAT YOUR FOOD WOULD RUN OUT BEFORE YOU GOT MONEY TO BUY MORE.: PATIENT DECLINED

## 2020-01-16 SDOH — ECONOMIC STABILITY: INCOME INSECURITY: HOW HARD IS IT FOR YOU TO PAY FOR THE VERY BASICS LIKE FOOD, HOUSING, MEDICAL CARE, AND HEATING?: PATIENT DECLINED

## 2020-01-16 SDOH — ECONOMIC STABILITY: FOOD INSECURITY: WITHIN THE PAST 12 MONTHS, THE FOOD YOU BOUGHT JUST DIDN'T LAST AND YOU DIDN'T HAVE MONEY TO GET MORE.: PATIENT DECLINED

## 2020-01-16 SDOH — ECONOMIC STABILITY: TRANSPORTATION INSECURITY
IN THE PAST 12 MONTHS, HAS LACK OF TRANSPORTATION KEPT YOU FROM MEETINGS, WORK, OR FROM GETTING THINGS NEEDED FOR DAILY LIVING?: PATIENT DECLINED

## 2020-01-16 ASSESSMENT — PATIENT HEALTH QUESTIONNAIRE - PHQ9
SUM OF ALL RESPONSES TO PHQ9 QUESTIONS 1 & 2: 0
SUM OF ALL RESPONSES TO PHQ QUESTIONS 1-9: 0
2. FEELING DOWN, DEPRESSED OR HOPELESS: 0
1. LITTLE INTEREST OR PLEASURE IN DOING THINGS: 0
SUM OF ALL RESPONSES TO PHQ QUESTIONS 1-9: 0

## 2020-01-16 NOTE — PROGRESS NOTES
Name:  Maxx Lugo  :    1955    Chief Complaint   Patient presents with    Cough     on going       HPI:  See notes. We had issues about 6 - 8 weeks ago of sinusitis. We also started Motrin. She developed hive snd we had to change all meds. Now cleared and restarted old meds without issues. Appears that hives were Omnicef related. Now has worse cough and some SOB. No head congestion or PND. Some fatigue. Physical Exam:      /70 (Site: Left Upper Arm, Position: Sitting, Cuff Size: Large Adult)   Pulse 92   Temp 98.9 °F (37.2 °C) (Oral)   Ht 5' 9\" (1.753 m)   Wt 296 lb 12.8 oz (134.6 kg)   BMI 43.83 kg/m²     Not ill. ENT is clear. Lungs with rales in RLL. Left is clear. No wheezes. Heart in RRR with no murmur. Assessment/Plan:      Pneumonia    Amy Toussaint was seen today for cough. Diagnoses and all orders for this visit:    Pneumonia of right lower lobe due to infectious organism ([address]  -     albuterol sulfate (PROAIR RESPICLICK) 056 (90 Base) MCG/ACT aerosol powder inhalation; Inhale 2 puffs into the lungs every 6 hours as needed for Wheezing or Shortness of Breath  -     azithromycin (ZITHROMAX Z-RADHA) 250 MG tablet; Take two (2) tablets by mouth on day 1, then take one (1) tablet by mouth daily for four (4) days. Other orders  -     diclofenac (VOLTAREN) 75 MG EC tablet;  Take 1 tablet by mouth 2 times daily

## 2020-01-23 ENCOUNTER — TELEPHONE (OUTPATIENT)
Dept: FAMILY MEDICINE CLINIC | Age: 65
End: 2020-01-23

## 2020-01-23 NOTE — TELEPHONE ENCOUNTER
Queta Dugan called in stating that she is still coughing. She is not wheezing like she was before, but her ATB is completed. She still has\" stuff draining in her throat\" that makes her cough. She is using her inhaler. Any Orders?

## 2020-03-12 ENCOUNTER — OFFICE VISIT (OUTPATIENT)
Dept: FAMILY MEDICINE CLINIC | Age: 65
End: 2020-03-12
Payer: COMMERCIAL

## 2020-03-12 VITALS
WEIGHT: 293 LBS | DIASTOLIC BLOOD PRESSURE: 72 MMHG | BODY MASS INDEX: 43.4 KG/M2 | SYSTOLIC BLOOD PRESSURE: 132 MMHG | HEART RATE: 88 BPM | HEIGHT: 69 IN | TEMPERATURE: 99.1 F

## 2020-03-12 LAB
BILIRUBIN, POC: ABNORMAL
BLOOD URINE, POC: ABNORMAL
CLARITY, POC: CLEAR
COLOR, POC: YELLOW
GLUCOSE URINE, POC: NEGATIVE
KETONES, POC: ABNORMAL
LEUKOCYTE EST, POC: ABNORMAL
NITRITE, POC: NEGATIVE
PH, POC: 5.5
PROTEIN, POC: ABNORMAL
SPECIFIC GRAVITY, POC: 1.02
UROBILINOGEN, POC: ABNORMAL

## 2020-03-12 PROCEDURE — 81002 URINALYSIS NONAUTO W/O SCOPE: CPT | Performed by: FAMILY MEDICINE

## 2020-03-12 PROCEDURE — G8484 FLU IMMUNIZE NO ADMIN: HCPCS | Performed by: FAMILY MEDICINE

## 2020-03-12 PROCEDURE — 99213 OFFICE O/P EST LOW 20 MIN: CPT | Performed by: FAMILY MEDICINE

## 2020-03-12 PROCEDURE — 1036F TOBACCO NON-USER: CPT | Performed by: FAMILY MEDICINE

## 2020-03-12 PROCEDURE — G8427 DOCREV CUR MEDS BY ELIG CLIN: HCPCS | Performed by: FAMILY MEDICINE

## 2020-03-12 PROCEDURE — G8417 CALC BMI ABV UP PARAM F/U: HCPCS | Performed by: FAMILY MEDICINE

## 2020-03-12 PROCEDURE — 3017F COLORECTAL CA SCREEN DOC REV: CPT | Performed by: FAMILY MEDICINE

## 2020-03-12 RX ORDER — CIPROFLOXACIN 500 MG/1
500 TABLET, FILM COATED ORAL 2 TIMES DAILY
Qty: 14 TABLET | Refills: 0 | Status: SHIPPED | OUTPATIENT
Start: 2020-03-12 | End: 2020-03-19

## 2020-03-12 RX ORDER — TRAMADOL HYDROCHLORIDE 50 MG/1
TABLET ORAL
COMMUNITY
Start: 2020-03-05 | End: 2021-03-31

## 2020-03-12 RX ORDER — FLUCONAZOLE 150 MG/1
150 TABLET ORAL ONCE
Qty: 1 TABLET | Refills: 0 | Status: SHIPPED | OUTPATIENT
Start: 2020-03-12 | End: 2020-03-12

## 2020-05-13 ENCOUNTER — HOSPITAL ENCOUNTER (OUTPATIENT)
Dept: MAMMOGRAPHY | Age: 65
Discharge: HOME | End: 2020-05-13
Payer: COMMERCIAL

## 2020-05-13 PROCEDURE — 77063 BREAST TOMOSYNTHESIS BI: CPT

## 2020-07-07 ENCOUNTER — HOSPITAL ENCOUNTER (OUTPATIENT)
Age: 65
Discharge: HOME | End: 2020-07-07
Payer: COMMERCIAL

## 2020-07-07 LAB
ANION GAP SERPL CALCULATED.3IONS-SCNC: 12 MEQ/L (ref 8–16)
BUN BLDV-MCNC: 27 MG/DL (ref 7–22)
CHLORIDE BLD-SCNC: 103 MEQ/L (ref 98–111)
CO2: 27 MEQ/L (ref 23–33)
CREAT SERPL-MCNC: 0.9 MG/DL (ref 0.4–1.2)
EKG ATRIAL RATE: 87 BPM
EKG P AXIS: 55 DEGREES
EKG P-R INTERVAL: 154 MS
EKG Q-T INTERVAL: 366 MS
EKG QRS DURATION: 88 MS
EKG QTC CALCULATION (BAZETT): 440 MS
EKG R AXIS: 20 DEGREES
EKG T AXIS: 37 DEGREES
EKG VENTRICULAR RATE: 87 BPM
ERYTHROCYTE [DISTWIDTH] IN BLOOD BY AUTOMATED COUNT: 13.1 % (ref 11.5–14.5)
ERYTHROCYTE [DISTWIDTH] IN BLOOD BY AUTOMATED COUNT: 45.4 FL (ref 35–45)
GFR SERPL CREATININE-BSD FRML MDRD: 63 ML/MIN/1.73M2
GLUCOSE BLD-MCNC: 107 MG/DL (ref 70–108)
HCT VFR BLD CALC: 38.7 % (ref 37–47)
HEMOGLOBIN: 12.3 GM/DL (ref 12–16)
MCH RBC QN AUTO: 30.7 PG (ref 26–33)
MCHC RBC AUTO-ENTMCNC: 31.8 GM/DL (ref 32.2–35.5)
MCV RBC AUTO: 96.5 FL (ref 81–99)
PLATELET # BLD: 309 THOU/MM3 (ref 130–400)
PMV BLD AUTO: 10.8 FL (ref 9.4–12.4)
POTASSIUM SERPL-SCNC: 4 MEQ/L (ref 3.5–5.2)
RBC # BLD: 4.01 MILL/MM3 (ref 4.2–5.4)
SODIUM BLD-SCNC: 142 MEQ/L (ref 135–145)
WBC # BLD: 4.8 THOU/MM3 (ref 4.8–10.8)

## 2020-07-07 PROCEDURE — 82947 ASSAY GLUCOSE BLOOD QUANT: CPT

## 2020-07-07 PROCEDURE — 80051 ELECTROLYTE PANEL: CPT

## 2020-07-07 PROCEDURE — 36415 COLL VENOUS BLD VENIPUNCTURE: CPT

## 2020-07-07 PROCEDURE — 87081 CULTURE SCREEN ONLY: CPT

## 2020-07-07 PROCEDURE — 84520 ASSAY OF UREA NITROGEN: CPT

## 2020-07-07 PROCEDURE — 93005 ELECTROCARDIOGRAM TRACING: CPT | Performed by: ORTHOPAEDIC SURGERY

## 2020-07-07 PROCEDURE — 85027 COMPLETE CBC AUTOMATED: CPT

## 2020-07-07 PROCEDURE — 82565 ASSAY OF CREATININE: CPT

## 2020-07-09 LAB — MRSA SCREEN: NORMAL

## 2020-09-30 ENCOUNTER — OFFICE VISIT (OUTPATIENT)
Dept: FAMILY MEDICINE CLINIC | Age: 65
End: 2020-09-30
Payer: COMMERCIAL

## 2020-09-30 VITALS
BODY MASS INDEX: 42.63 KG/M2 | TEMPERATURE: 97.3 F | SYSTOLIC BLOOD PRESSURE: 118 MMHG | HEART RATE: 92 BPM | WEIGHT: 287.8 LBS | DIASTOLIC BLOOD PRESSURE: 76 MMHG | HEIGHT: 69 IN

## 2020-09-30 PROBLEM — E78.00 PURE HYPERCHOLESTEROLEMIA: Status: ACTIVE | Noted: 2019-06-03

## 2020-09-30 PROBLEM — K21.9 GASTROESOPHAGEAL REFLUX DISEASE WITHOUT ESOPHAGITIS: Status: ACTIVE | Noted: 2020-09-30

## 2020-09-30 PROBLEM — J45.20 MILD INTERMITTENT ASTHMA WITHOUT COMPLICATION: Status: ACTIVE | Noted: 2020-09-30

## 2020-09-30 PROCEDURE — 4040F PNEUMOC VAC/ADMIN/RCVD: CPT | Performed by: FAMILY MEDICINE

## 2020-09-30 PROCEDURE — 99214 OFFICE O/P EST MOD 30 MIN: CPT | Performed by: FAMILY MEDICINE

## 2020-09-30 PROCEDURE — G8417 CALC BMI ABV UP PARAM F/U: HCPCS | Performed by: FAMILY MEDICINE

## 2020-09-30 PROCEDURE — 1090F PRES/ABSN URINE INCON ASSESS: CPT | Performed by: FAMILY MEDICINE

## 2020-09-30 PROCEDURE — 1036F TOBACCO NON-USER: CPT | Performed by: FAMILY MEDICINE

## 2020-09-30 PROCEDURE — 1123F ACP DISCUSS/DSCN MKR DOCD: CPT | Performed by: FAMILY MEDICINE

## 2020-09-30 PROCEDURE — G8427 DOCREV CUR MEDS BY ELIG CLIN: HCPCS | Performed by: FAMILY MEDICINE

## 2020-09-30 PROCEDURE — G8400 PT W/DXA NO RESULTS DOC: HCPCS | Performed by: FAMILY MEDICINE

## 2020-09-30 PROCEDURE — 3017F COLORECTAL CA SCREEN DOC REV: CPT | Performed by: FAMILY MEDICINE

## 2020-09-30 RX ORDER — LANOLIN ALCOHOL/MO/W.PET/CERES
3 CREAM (GRAM) TOPICAL NIGHTLY PRN
COMMUNITY
End: 2021-03-31

## 2020-09-30 RX ORDER — LISINOPRIL AND HYDROCHLOROTHIAZIDE 25; 20 MG/1; MG/1
1 TABLET ORAL DAILY
Qty: 90 TABLET | Refills: 3 | Status: SHIPPED | OUTPATIENT
Start: 2020-09-30 | End: 2021-09-30 | Stop reason: SDUPTHER

## 2020-09-30 RX ORDER — PRAVASTATIN SODIUM 20 MG
20 TABLET ORAL DAILY
COMMUNITY
End: 2020-09-30 | Stop reason: SDUPTHER

## 2020-09-30 RX ORDER — PRAVASTATIN SODIUM 20 MG
20 TABLET ORAL DAILY
Qty: 90 TABLET | Refills: 3 | Status: SHIPPED | OUTPATIENT
Start: 2020-09-30 | End: 2021-09-30 | Stop reason: SDUPTHER

## 2020-09-30 RX ORDER — ASPIRIN 81 MG/1
81 TABLET, CHEWABLE ORAL DAILY
COMMUNITY
End: 2021-03-31

## 2020-09-30 ASSESSMENT — ENCOUNTER SYMPTOMS: SHORTNESS OF BREATH: 0

## 2020-09-30 NOTE — PROGRESS NOTES
[address] [place] [address] [address], [address], [place]  Phone:  [phone]  CEZ:[phone]       Name: Ryan Whyte  : 1955    Chief Complaint   Patient presents with    Hypertension       HPI:     Ryan Whyte is a 72 y.o. female who presents today for     HPI    Follow up of chronic medical conditions   HTN well controlled, HLD well controlled. Recovering nicely from the knee replacement surgery. She continues to take the aspirin now is been a couple of months. She will be stopping it soon. Still trouble with steps and up and down. She did some therapy and is now doing bike riding at the home. Ibuprofen as needed or tylenol. No longer taking diclofenac  Tramadol used as needed with knee pain, not nightly. Albuterol use is seasonal, used in spring and now in fall. Use for mild intermittent asthma. Dust from harvesting. GERD -- prilosec 20 mg daily working well. IBS well controlled by using FiberCon twice a day. Current Outpatient Medications:     aspirin 81 MG chewable tablet, Take 81 mg by mouth daily, Disp: , Rfl:     melatonin 3 MG TABS tablet, Take 3 mg by mouth nightly as needed, Disp: , Rfl:     lisinopril-hydroCHLOROthiazide (PRINZIDE;ZESTORETIC) 20-25 MG per tablet, Take 1 tablet by mouth daily, Disp: 90 tablet, Rfl: 3    pravastatin (PRAVACHOL) 20 MG tablet, Take 1 tablet by mouth daily, Disp: 90 tablet, Rfl: 3    traMADol (ULTRAM) 50 MG tablet, , Disp: , Rfl:     albuterol sulfate (PROAIR RESPICLICK) 154 (90 Base) MCG/ACT aerosol powder inhalation, Inhale 2 puffs into the lungs every 6 hours as needed for Wheezing or Shortness of Breath, Disp: 1 Inhaler, Rfl: 1    ibuprofen (ADVIL;MOTRIN) 600 MG tablet, Take 1 tablet by mouth 3 times daily as needed for Pain, Disp: 270 tablet, Rfl: 3    Multiple Vitamins-Minerals (WOMENS MULTI VITAMIN & MINERAL PO), Take 1 tablet by mouth daily. , Disp: , Rfl:     Calcium Polycarbophil (FIBERCON PO), Take 1 tablet by mouth 2 times daily. , Disp: , Rfl:     Omeprazole Magnesium (PRILOSEC OTC PO), Take 1 tablet by mouth daily. , Disp: , Rfl:     Allergies   Allergen Reactions    Cefdinir      rash    Amoxicillin Rash       Review of Systems   Constitutional: Negative for fatigue and fever. Respiratory: Negative for shortness of breath. Cardiovascular: Negative for chest pain and leg swelling. Musculoskeletal: Positive for arthralgias (knee pain on left s/p surgery). Objective:     /76 (Site: Left Upper Arm, Position: Sitting, Cuff Size: Large Adult)   Pulse 92   Temp 97.3 °F (36.3 °C) (Temporal)   Ht 5' 9\" (1.753 m)   Wt 287 lb 12.8 oz (130.5 kg)   BMI 42.50 kg/m²     Physical Exam  Constitutional:       General: She is not in acute distress. Appearance: Normal appearance. She is not ill-appearing. Cardiovascular:      Rate and Rhythm: Normal rate and regular rhythm. Heart sounds: No murmur. Pulmonary:      Effort: Pulmonary effort is normal. No respiratory distress. Breath sounds: Normal breath sounds. No wheezing. Musculoskeletal:         General: No swelling. Comments: Left knee wound healed nicely. Mild swelling laterally. Slight numbness as well -- told that it may take several months     Neurological:      Mental Status: She is alert.    Psychiatric:         Mood and Affect: Mood normal.         Behavior: Behavior normal.          Lab Results   Component Value Date    CHOL 184 09/28/2019    CHOL 242 (H) 09/29/2018    CHOL 230 (H) 09/30/2017     Lab Results   Component Value Date    TRIG 113 09/28/2019    TRIG 135 09/29/2018    TRIG 99 09/30/2017     Lab Results   Component Value Date    HDL 46 09/28/2019    HDL 46 09/29/2018    HDL 50 09/30/2017     Lab Results   Component Value Date    LDLCALC 115 (H) 09/28/2019    LDLCALC 169 (H) 09/29/2018    LDLCALC 160 (H) 09/30/2017     Lab Results   Component Value Date    VLDL 22 09/28/2019    VLDL 27 09/29/2018 VLDL 19 09/30/2017     No results found for: [facility]  Lab Results   Component Value Date    WBC 4.8 07/07/2020    HGB 12.3 07/07/2020    HCT 38.7 07/07/2020    MCV 96.5 07/07/2020     07/07/2020     Lab Results   Component Value Date     07/07/2020    K 4.0 07/07/2020     07/07/2020    CO2 27 07/07/2020    BUN 27 07/07/2020    CREATININE 0.9 07/07/2020    GLUCOSE 107 07/07/2020    GLUCOSE 95 09/28/2019    CALCIUM 9.40 09/28/2019      Lab Results   Component Value Date    ALT 15 09/28/2019    AST 17 09/28/2019    ALKPHOS 84 09/28/2019    BILITOT 0.6 09/28/2019         Assessment/Plan:     Adamaris Jordan was seen today for hypertension. Diagnoses and all orders for this visit:    Essential hypertension  -     lisinopril-hydroCHLOROthiazide (PRINZIDE;ZESTORETIC) 20-25 MG per tablet; Take 1 tablet by mouth daily  -     Lipid Panel; Future  -     Comprehensive Metabolic Panel; Future    Gastroesophageal reflux disease without esophagitis    Pure hypercholesterolemia  -     pravastatin (PRAVACHOL) 20 MG tablet; Take 1 tablet by mouth daily  -     Lipid Panel; Future  -     Comprehensive Metabolic Panel; Future    Screening for diabetes mellitus  -     Hemoglobin A1C; Future    Mild intermittent asthma without complication    Patient overall doing well. She is stable on current medications. We will continue with current medication. Aspirin may be stopped as she is now moving significantly better. To 90 days. Laboratory update will be obtained. Encourage healthy eating and healthy activity as she is able to increase his such. Discussed considering PPI use intermittently if able. Return in about 6 months (around 3/30/2021). No future appointments. This office note has been dictated. Effort was made to review for errors but some may have been missed. Please contact [facility] [employer] for clarification if needed.        Electronically signed by Anila Montano MD on 9/30/2020 at 2:05 PM

## 2020-10-06 ENCOUNTER — HOSPITAL ENCOUNTER (OUTPATIENT)
Age: 65
Discharge: HOME | End: 2020-10-06
Payer: COMMERCIAL

## 2020-10-06 DIAGNOSIS — E78.00 PURE HYPERCHOLESTEROLEMIA: ICD-10-CM

## 2020-10-06 DIAGNOSIS — I10 ESSENTIAL HYPERTENSION: ICD-10-CM

## 2020-10-06 DIAGNOSIS — Z13.1 SCREENING FOR DIABETES MELLITUS: ICD-10-CM

## 2020-10-06 LAB
ALBUMIN SERPL-MCNC: 4 G/DL (ref 3.5–5.1)
ALP BLD-CCNC: 101 U/L (ref 38–126)
ALT SERPL-CCNC: 11 U/L (ref 11–66)
ANION GAP SERPL CALCULATED.3IONS-SCNC: 10 MEQ/L (ref 8–16)
AST SERPL-CCNC: 16 U/L (ref 5–40)
AVERAGE GLUCOSE: 111 MG/DL (ref 70–126)
BILIRUB SERPL-MCNC: 0.4 MG/DL (ref 0.3–1.2)
BUN BLDV-MCNC: 20 MG/DL (ref 7–22)
CALCIUM SERPL-MCNC: 9.7 MG/DL (ref 8.5–10.5)
CHLORIDE BLD-SCNC: 101 MEQ/L (ref 98–111)
CHOLESTEROL, TOTAL: 168 MG/DL (ref 100–199)
CO2: 28 MEQ/L (ref 23–33)
CREAT SERPL-MCNC: 0.8 MG/DL (ref 0.4–1.2)
GFR SERPL CREATININE-BSD FRML MDRD: 72 ML/MIN/1.73M2
GLUCOSE BLD-MCNC: 102 MG/DL (ref 70–108)
HBA1C MFR BLD: 5.7 % (ref 4.4–6.4)
HDLC SERPL-MCNC: 41 MG/DL
LDL CHOLESTEROL CALCULATED: 103 MG/DL
POTASSIUM SERPL-SCNC: 4 MEQ/L (ref 3.5–5.2)
SODIUM BLD-SCNC: 139 MEQ/L (ref 135–145)
TOTAL PROTEIN: 7 G/DL (ref 6.1–8)
TRIGL SERPL-MCNC: 118 MG/DL (ref 0–199)

## 2020-10-06 PROCEDURE — 36415 COLL VENOUS BLD VENIPUNCTURE: CPT

## 2020-10-06 PROCEDURE — 80053 COMPREHEN METABOLIC PANEL: CPT

## 2020-10-06 PROCEDURE — 80061 LIPID PANEL: CPT

## 2020-10-06 PROCEDURE — 83036 HEMOGLOBIN GLYCOSYLATED A1C: CPT

## 2020-10-22 ENCOUNTER — NURSE ONLY (OUTPATIENT)
Dept: FAMILY MEDICINE CLINIC | Age: 65
End: 2020-10-22
Payer: COMMERCIAL

## 2020-10-22 PROCEDURE — 90471 IMMUNIZATION ADMIN: CPT | Performed by: FAMILY MEDICINE

## 2020-10-22 PROCEDURE — 90694 VACC AIIV4 NO PRSRV 0.5ML IM: CPT | Performed by: FAMILY MEDICINE

## 2020-10-22 PROCEDURE — 90732 PPSV23 VACC 2 YRS+ SUBQ/IM: CPT | Performed by: FAMILY MEDICINE

## 2020-10-22 PROCEDURE — 90472 IMMUNIZATION ADMIN EACH ADD: CPT | Performed by: FAMILY MEDICINE

## 2020-10-22 NOTE — PROGRESS NOTES
After obtaining consent, and per orders of Al Escobar MD  Immunization(s) and/or medication(s) given during visit by Pj Amaya CMA (Lizeth Todd)              Immunizations Administered     Name Date Dose Route    Influenza, Quadv, adjuvanted, 65 yrs +, IM, PF (Fluad) 10/22/2020 0.5 mL Intramuscular    Site: Deltoid- Left    Lot: [id number]    NDC: 41780-769-90    Pneumococcal Polysaccharide ([id number]) 10/22/2020 0.5 mL Intramuscular    Site: Deltoid- Right    Lot: [id number]    NDC: 6170-7994-68

## 2021-03-03 ENCOUNTER — CLINICAL DOCUMENTATION (OUTPATIENT)
Dept: SPIRITUAL SERVICES | Facility: CLINIC | Age: 66
End: 2021-03-03

## 2021-03-31 ENCOUNTER — OFFICE VISIT (OUTPATIENT)
Dept: FAMILY MEDICINE CLINIC | Age: 66
End: 2021-03-31
Payer: COMMERCIAL

## 2021-03-31 VITALS
TEMPERATURE: 97 F | SYSTOLIC BLOOD PRESSURE: 126 MMHG | WEIGHT: 293 LBS | OXYGEN SATURATION: 98 % | DIASTOLIC BLOOD PRESSURE: 66 MMHG | BODY MASS INDEX: 44.15 KG/M2 | HEART RATE: 81 BPM

## 2021-03-31 DIAGNOSIS — E78.00 PURE HYPERCHOLESTEROLEMIA: ICD-10-CM

## 2021-03-31 DIAGNOSIS — K21.9 GASTROESOPHAGEAL REFLUX DISEASE WITHOUT ESOPHAGITIS: ICD-10-CM

## 2021-03-31 DIAGNOSIS — R73.03 PREDIABETES: ICD-10-CM

## 2021-03-31 DIAGNOSIS — I10 ESSENTIAL HYPERTENSION: Primary | ICD-10-CM

## 2021-03-31 PROCEDURE — 1036F TOBACCO NON-USER: CPT | Performed by: FAMILY MEDICINE

## 2021-03-31 PROCEDURE — G8427 DOCREV CUR MEDS BY ELIG CLIN: HCPCS | Performed by: FAMILY MEDICINE

## 2021-03-31 PROCEDURE — G8417 CALC BMI ABV UP PARAM F/U: HCPCS | Performed by: FAMILY MEDICINE

## 2021-03-31 PROCEDURE — G8400 PT W/DXA NO RESULTS DOC: HCPCS | Performed by: FAMILY MEDICINE

## 2021-03-31 PROCEDURE — 3017F COLORECTAL CA SCREEN DOC REV: CPT | Performed by: FAMILY MEDICINE

## 2021-03-31 PROCEDURE — G8484 FLU IMMUNIZE NO ADMIN: HCPCS | Performed by: FAMILY MEDICINE

## 2021-03-31 PROCEDURE — 4040F PNEUMOC VAC/ADMIN/RCVD: CPT | Performed by: FAMILY MEDICINE

## 2021-03-31 PROCEDURE — 99213 OFFICE O/P EST LOW 20 MIN: CPT | Performed by: FAMILY MEDICINE

## 2021-03-31 PROCEDURE — 1123F ACP DISCUSS/DSCN MKR DOCD: CPT | Performed by: FAMILY MEDICINE

## 2021-03-31 PROCEDURE — 1090F PRES/ABSN URINE INCON ASSESS: CPT | Performed by: FAMILY MEDICINE

## 2021-03-31 ASSESSMENT — PATIENT HEALTH QUESTIONNAIRE - PHQ9
2. FEELING DOWN, DEPRESSED OR HOPELESS: 0
SUM OF ALL RESPONSES TO PHQ QUESTIONS 1-9: 0
SUM OF ALL RESPONSES TO PHQ QUESTIONS 1-9: 0
SUM OF ALL RESPONSES TO PHQ9 QUESTIONS 1 & 2: 0
SUM OF ALL RESPONSES TO PHQ QUESTIONS 1-9: 0
1. LITTLE INTEREST OR PLEASURE IN DOING THINGS: 0

## 2021-03-31 ASSESSMENT — ENCOUNTER SYMPTOMS: SHORTNESS OF BREATH: 0

## 2021-05-26 ENCOUNTER — HOSPITAL ENCOUNTER (OUTPATIENT)
Dept: MAMMOGRAPHY | Age: 66
Discharge: HOME | End: 2021-05-26
Payer: COMMERCIAL

## 2021-05-26 DIAGNOSIS — Z12.31 VISIT FOR SCREENING MAMMOGRAM: ICD-10-CM

## 2021-05-26 PROCEDURE — 77063 BREAST TOMOSYNTHESIS BI: CPT

## 2021-07-20 DIAGNOSIS — J18.9 PNEUMONIA OF RIGHT LOWER LOBE DUE TO INFECTIOUS ORGANISM: ICD-10-CM

## 2021-07-20 NOTE — TELEPHONE ENCOUNTER
Willow Kirby called requesting a refill on the following medications:  Requested Prescriptions     Pending Prescriptions Disp Refills    albuterol sulfate (PROAIR RESPICLICK) 832 (90 Base) MCG/ACT aerosol powder inhalation 1 Inhaler 1     Sig: Inhale 2 puffs into the lungs every 6 hours as needed for Wheezing or Shortness of Breath       Date of last visit: 3/31/2021  Date of next visit (if applicable):Visit date not found  Date of last refill: 01/06/20  Pharmacy Name: Minal Barclay LPN

## 2021-07-21 RX ORDER — ALBUTEROL SULFATE 90 UG/1
2 POWDER, METERED RESPIRATORY (INHALATION) EVERY 6 HOURS PRN
Qty: 1 INHALER | Refills: 1 | Status: SHIPPED | OUTPATIENT
Start: 2021-07-21 | End: 2021-07-22 | Stop reason: CLARIF

## 2021-07-22 RX ORDER — ALBUTEROL SULFATE 90 UG/1
2 POWDER, METERED RESPIRATORY (INHALATION) EVERY 6 HOURS PRN
Qty: 1 INHALER | Refills: 1 | Status: SHIPPED | OUTPATIENT
Start: 2021-07-22 | End: 2022-05-04

## 2021-07-30 ENCOUNTER — OFFICE VISIT (OUTPATIENT)
Dept: FAMILY MEDICINE CLINIC | Age: 66
End: 2021-07-30
Payer: COMMERCIAL

## 2021-07-30 VITALS
SYSTOLIC BLOOD PRESSURE: 138 MMHG | WEIGHT: 293 LBS | HEIGHT: 69 IN | DIASTOLIC BLOOD PRESSURE: 70 MMHG | OXYGEN SATURATION: 98 % | HEART RATE: 106 BPM | BODY MASS INDEX: 43.4 KG/M2

## 2021-07-30 DIAGNOSIS — J20.9 ACUTE BRONCHITIS, UNSPECIFIED ORGANISM: Primary | ICD-10-CM

## 2021-07-30 PROCEDURE — 99213 OFFICE O/P EST LOW 20 MIN: CPT | Performed by: FAMILY MEDICINE

## 2021-07-30 PROCEDURE — G8400 PT W/DXA NO RESULTS DOC: HCPCS | Performed by: FAMILY MEDICINE

## 2021-07-30 PROCEDURE — 4040F PNEUMOC VAC/ADMIN/RCVD: CPT | Performed by: FAMILY MEDICINE

## 2021-07-30 PROCEDURE — 1036F TOBACCO NON-USER: CPT | Performed by: FAMILY MEDICINE

## 2021-07-30 PROCEDURE — G8427 DOCREV CUR MEDS BY ELIG CLIN: HCPCS | Performed by: FAMILY MEDICINE

## 2021-07-30 PROCEDURE — 1123F ACP DISCUSS/DSCN MKR DOCD: CPT | Performed by: FAMILY MEDICINE

## 2021-07-30 PROCEDURE — 1090F PRES/ABSN URINE INCON ASSESS: CPT | Performed by: FAMILY MEDICINE

## 2021-07-30 PROCEDURE — G8417 CALC BMI ABV UP PARAM F/U: HCPCS | Performed by: FAMILY MEDICINE

## 2021-07-30 PROCEDURE — 3017F COLORECTAL CA SCREEN DOC REV: CPT | Performed by: FAMILY MEDICINE

## 2021-07-30 RX ORDER — LORATADINE 10 MG/1
10 CAPSULE, LIQUID FILLED ORAL DAILY
COMMUNITY
End: 2021-09-30 | Stop reason: ALTCHOICE

## 2021-07-30 RX ORDER — DICLOFENAC SODIUM 75 MG/1
75 TABLET, DELAYED RELEASE ORAL 2 TIMES DAILY
COMMUNITY
End: 2021-09-30 | Stop reason: ALTCHOICE

## 2021-07-30 RX ORDER — PREDNISONE 20 MG/1
40 TABLET ORAL DAILY
Qty: 10 TABLET | Refills: 0 | Status: SHIPPED | OUTPATIENT
Start: 2021-07-30 | End: 2021-08-23 | Stop reason: ALTCHOICE

## 2021-07-30 RX ORDER — BENZONATATE 100 MG/1
100 CAPSULE ORAL 3 TIMES DAILY PRN
Qty: 30 CAPSULE | Refills: 0 | Status: SHIPPED | OUTPATIENT
Start: 2021-07-30 | End: 2021-08-06

## 2021-07-30 RX ORDER — AZITHROMYCIN 250 MG/1
TABLET, FILM COATED ORAL
Qty: 6 TABLET | Refills: 0 | Status: SHIPPED | OUTPATIENT
Start: 2021-07-30 | End: 2021-08-23

## 2021-07-30 ASSESSMENT — ENCOUNTER SYMPTOMS
WHEEZING: 0
COUGH: 1
RHINORRHEA: 0
SORE THROAT: 0
SHORTNESS OF BREATH: 1

## 2021-07-30 NOTE — PROGRESS NOTES
[address] [place] [address] [address], [address], [place]  Phone:  [phone]  F:[phone]       Name: Euel Paget  : 1955    Chief Complaint   Patient presents with    Cough     for month or more       HPI:     Euel Paget is a 77 y.o. female who presents today for evaluation of a cough. It began 1 month ago and has not improved. She thinks it was first from allergies as she had some congestion and headache, but that has improved and the cough has not. The cough is nonproductive. Yesterday when it was very humid she felt SOB outside. She has asthma and is on Albuterol which she has been taking at least 2-3x/day. She is on Lisinopril but has been for about 3 years. Current Outpatient Medications:     diclofenac (VOLTAREN) 75 MG EC tablet, Take 75 mg by mouth 2 times daily, Disp: , Rfl:     loratadine (CLARITIN) 10 MG capsule, Take 10 mg by mouth daily, Disp: , Rfl:     predniSONE (DELTASONE) 20 MG tablet, Take 2 tablets by mouth daily, Disp: 10 tablet, Rfl: 0    benzonatate (TESSALON) 100 MG capsule, Take 1 capsule by mouth 3 times daily as needed for Cough, Disp: 30 capsule, Rfl: 0    azithromycin (ZITHROMAX Z-RADHA) 250 MG tablet, Take two (2) tablets by mouth on day 1, then take one (1) tablet by mouth daily for four (4) days. , Disp: 6 tablet, Rfl: 0    albuterol sulfate (PROAIR RESPICLICK) 096 (90 Base) MCG/ACT aerosol powder inhalation, Inhale 2 puffs into the lungs every 6 hours as needed for Wheezing or Shortness of Breath, Disp: 1 Inhaler, Rfl: 1    lisinopril-hydroCHLOROthiazide (PRINZIDE;ZESTORETIC) 20-25 MG per tablet, Take 1 tablet by mouth daily, Disp: 90 tablet, Rfl: 3    pravastatin (PRAVACHOL) 20 MG tablet, Take 1 tablet by mouth daily, Disp: 90 tablet, Rfl: 3    Multiple Vitamins-Minerals (WOMENS MULTI VITAMIN & MINERAL PO), Take 1 tablet by mouth daily. , Disp: , Rfl:     Calcium Polycarbophil (FIBERCON PO), Take 1 tablet by mouth 2 times daily., Disp: , Rfl:     Omeprazole Magnesium (PRILOSEC OTC PO), Take 1 tablet by mouth daily. , Disp: , Rfl:     Allergies   Allergen Reactions    Cefdinir      rash    Amoxicillin Rash       Subjective:      Review of Systems   Constitutional: Negative for chills and fever. HENT: Negative for congestion, rhinorrhea and sore throat. Respiratory: Positive for cough and shortness of breath. Negative for wheezing. Cardiovascular: Negative for chest pain and palpitations. Allergic/Immunologic: Positive for environmental allergies. Objective:     /70 (Site: Left Upper Arm, Position: Sitting, Cuff Size: Large Adult)   Pulse 106   Ht 5' 9\" (1.753 m)   Wt 298 lb (135.2 kg)   SpO2 98%   BMI 44.01 kg/m²     Physical Exam  Vitals and nursing note reviewed. Constitutional:       General: She is not in acute distress. Appearance: She is well-developed. HENT:      Head: Normocephalic and atraumatic. Right Ear: Tympanic membrane, ear canal and external ear normal.      Left Ear: Tympanic membrane, ear canal and external ear normal.      Nose: Nose normal.   Eyes:      Conjunctiva/sclera: Conjunctivae normal.   Cardiovascular:      Rate and Rhythm: Normal rate and regular rhythm. Heart sounds: Normal heart sounds. Pulmonary:      Effort: Pulmonary effort is normal. No respiratory distress. Breath sounds: Normal breath sounds. No wheezing (diffuse). Abdominal:      General: Bowel sounds are normal. There is no distension. Palpations: Abdomen is soft. Tenderness: There is no abdominal tenderness. Musculoskeletal:      Cervical back: Normal range of motion and neck supple. Skin:     General: Skin is warm and dry. Findings: No erythema or rash. Neurological:      Mental Status: She is alert and oriented to person, place, and time. Psychiatric:         Behavior: Behavior normal.       Assessment/Plan:     Sita Castillo was seen today for cough.     Diagnoses and all orders for this visit:    Acute bronchitis, unspecified organism  -     Symptoms have been present for a month so will treat as bacterial with antibiotics and steroids. May use Tessalon and Albuterol PRN. -     predniSONE (DELTASONE) 20 MG tablet; Take 2 tablets by mouth daily  -     benzonatate (TESSALON) 100 MG capsule; Take 1 capsule by mouth 3 times daily as needed for Cough  -     azithromycin (ZITHROMAX Z-RADHA) 250 MG tablet; Take two (2) tablets by mouth on day 1, then take one (1) tablet by mouth daily for four (4) days. Return if symptoms worsen or fail to improve.     Electronically signed by Latrice Puri MD on 7/30/2021 at 9:31 AM

## 2021-08-23 ENCOUNTER — TELEPHONE (OUTPATIENT)
Dept: FAMILY MEDICINE CLINIC | Age: 66
End: 2021-08-23

## 2021-08-23 DIAGNOSIS — J45.901 MODERATE ASTHMA WITH ACUTE EXACERBATION, UNSPECIFIED WHETHER PERSISTENT: Primary | ICD-10-CM

## 2021-08-23 RX ORDER — BUDESONIDE AND FORMOTEROL FUMARATE DIHYDRATE 160; 4.5 UG/1; UG/1
2 AEROSOL RESPIRATORY (INHALATION) 2 TIMES DAILY
Qty: 1 INHALER | Refills: 3 | Status: SHIPPED | OUTPATIENT
Start: 2021-08-23 | End: 2021-10-18 | Stop reason: SDUPTHER

## 2021-08-23 NOTE — TELEPHONE ENCOUNTER
Lady Covert calls and she was treated a month ago  For wheezes, a little short of breath, cough, the antibodic and steroid cleared it up but now it's starting to come back . Is there anything you can give her ?

## 2021-09-20 ENCOUNTER — HOSPITAL ENCOUNTER (OUTPATIENT)
Age: 66
Discharge: HOME | End: 2021-09-20
Payer: MEDICARE

## 2021-09-20 DIAGNOSIS — K21.9 GASTROESOPHAGEAL REFLUX DISEASE WITHOUT ESOPHAGITIS: ICD-10-CM

## 2021-09-20 DIAGNOSIS — R73.03 PREDIABETES: ICD-10-CM

## 2021-09-20 DIAGNOSIS — I10 ESSENTIAL HYPERTENSION: ICD-10-CM

## 2021-09-20 DIAGNOSIS — E78.00 PURE HYPERCHOLESTEROLEMIA: ICD-10-CM

## 2021-09-20 LAB
ALBUMIN SERPL-MCNC: 4.1 G/DL (ref 3.5–5.1)
ALP BLD-CCNC: 103 U/L (ref 38–126)
ALT SERPL-CCNC: 12 U/L (ref 11–66)
ANION GAP SERPL CALCULATED.3IONS-SCNC: 10 MEQ/L (ref 8–16)
AST SERPL-CCNC: 16 U/L (ref 5–40)
AVERAGE GLUCOSE: 114 MG/DL (ref 70–126)
BASOPHILS # BLD: 1.2 %
BASOPHILS ABSOLUTE: 0.1 THOU/MM3 (ref 0–0.1)
BILIRUB SERPL-MCNC: 0.4 MG/DL (ref 0.3–1.2)
BUN BLDV-MCNC: 25 MG/DL (ref 7–22)
CALCIUM SERPL-MCNC: 9.3 MG/DL (ref 8.5–10.5)
CHLORIDE BLD-SCNC: 106 MEQ/L (ref 98–111)
CHOLESTEROL, TOTAL: 183 MG/DL (ref 100–199)
CO2: 26 MEQ/L (ref 23–33)
CREAT SERPL-MCNC: 0.9 MG/DL (ref 0.4–1.2)
EOSINOPHIL # BLD: 7.8 %
EOSINOPHILS ABSOLUTE: 0.4 THOU/MM3 (ref 0–0.4)
ERYTHROCYTE [DISTWIDTH] IN BLOOD BY AUTOMATED COUNT: 13.1 % (ref 11.5–14.5)
ERYTHROCYTE [DISTWIDTH] IN BLOOD BY AUTOMATED COUNT: 46.4 FL (ref 35–45)
GFR SERPL CREATININE-BSD FRML MDRD: 63 ML/MIN/1.73M2
GLUCOSE BLD-MCNC: 98 MG/DL (ref 70–108)
HBA1C MFR BLD: 5.8 % (ref 4.4–6.4)
HCT VFR BLD CALC: 40.1 % (ref 37–47)
HDLC SERPL-MCNC: 43 MG/DL
HEMOGLOBIN: 12.5 GM/DL (ref 12–16)
IMMATURE GRANS (ABS): 0.02 THOU/MM3 (ref 0–0.07)
IMMATURE GRANULOCYTES: 0.4 %
LDL CHOLESTEROL CALCULATED: 114 MG/DL
LYMPHOCYTES # BLD: 27.7 %
LYMPHOCYTES ABSOLUTE: 1.6 THOU/MM3 (ref 1–4.8)
MCH RBC QN AUTO: 30.3 PG (ref 26–33)
MCHC RBC AUTO-ENTMCNC: 31.2 GM/DL (ref 32.2–35.5)
MCV RBC AUTO: 97.1 FL (ref 81–99)
MONOCYTES # BLD: 8.5 %
MONOCYTES ABSOLUTE: 0.5 THOU/MM3 (ref 0.4–1.3)
NUCLEATED RED BLOOD CELLS: 0 /100 WBC
PLATELET # BLD: 290 THOU/MM3 (ref 130–400)
PMV BLD AUTO: 10.5 FL (ref 9.4–12.4)
POTASSIUM SERPL-SCNC: 4.1 MEQ/L (ref 3.5–5.2)
RBC # BLD: 4.13 MILL/MM3 (ref 4.2–5.4)
SEG NEUTROPHILS: 54.4 %
SEGMENTED NEUTROPHILS ABSOLUTE COUNT: 3.1 THOU/MM3 (ref 1.8–7.7)
SODIUM BLD-SCNC: 142 MEQ/L (ref 135–145)
TOTAL PROTEIN: 6.8 G/DL (ref 6.1–8)
TRIGL SERPL-MCNC: 129 MG/DL (ref 0–199)
WBC # BLD: 5.7 THOU/MM3 (ref 4.8–10.8)

## 2021-09-20 PROCEDURE — 85025 COMPLETE CBC W/AUTO DIFF WBC: CPT

## 2021-09-20 PROCEDURE — 80053 COMPREHEN METABOLIC PANEL: CPT

## 2021-09-20 PROCEDURE — 36415 COLL VENOUS BLD VENIPUNCTURE: CPT

## 2021-09-20 PROCEDURE — 80061 LIPID PANEL: CPT

## 2021-09-20 PROCEDURE — 83036 HEMOGLOBIN GLYCOSYLATED A1C: CPT

## 2021-09-24 ASSESSMENT — LIFESTYLE VARIABLES
HOW OFTEN DURING THE LAST YEAR HAVE YOU HAD A FEELING OF GUILT OR REMORSE AFTER DRINKING: 0
AUDIT TOTAL SCORE: 0
HOW OFTEN DURING THE LAST YEAR HAVE YOU FAILED TO DO WHAT WAS NORMALLY EXPECTED FROM YOU BECAUSE OF DRINKING: NEVER
HAVE YOU OR SOMEONE ELSE BEEN INJURED AS A RESULT OF YOUR DRINKING: NO
HOW MANY STANDARD DRINKS CONTAINING ALCOHOL DO YOU HAVE ON A TYPICAL DAY: 0
AUDIT TOTAL SCORE: 1
HAS A RELATIVE, FRIEND, DOCTOR, OR ANOTHER HEALTH PROFESSIONAL EXPRESSED CONCERN ABOUT YOUR DRINKING OR SUGGESTED YOU CUT DOWN: NO
HOW OFTEN DURING THE LAST YEAR HAVE YOU NEEDED AN ALCOHOLIC DRINK FIRST THING IN THE MORNING TO GET YOURSELF GOING AFTER A NIGHT OF HEAVY DRINKING: 0
AUDIT-C TOTAL SCORE: 0
HOW OFTEN DURING THE LAST YEAR HAVE YOU NEEDED AN ALCOHOLIC DRINK FIRST THING IN THE MORNING TO GET YOURSELF GOING AFTER A NIGHT OF HEAVY DRINKING: NEVER
HOW OFTEN DURING THE LAST YEAR HAVE YOU FOUND THAT YOU WERE NOT ABLE TO STOP DRINKING ONCE YOU HAD STARTED: NEVER
HAS A RELATIVE, FRIEND, DOCTOR, OR ANOTHER HEALTH PROFESSIONAL EXPRESSED CONCERN ABOUT YOUR DRINKING OR SUGGESTED YOU CUT DOWN: 0
HOW OFTEN DURING THE LAST YEAR HAVE YOU FAILED TO DO WHAT WAS NORMALLY EXPECTED FROM YOU BECAUSE OF DRINKING: 0
HOW OFTEN DO YOU HAVE A DRINK CONTAINING ALCOHOL: 1
HOW OFTEN DO YOU HAVE SIX OR MORE DRINKS ON ONE OCCASION: NEVER
AUDIT-C TOTAL SCORE: 1
HOW OFTEN DURING THE LAST YEAR HAVE YOU BEEN UNABLE TO REMEMBER WHAT HAPPENED THE NIGHT BEFORE BECAUSE YOU HAD BEEN DRINKING: NEVER
HOW OFTEN DURING THE LAST YEAR HAVE YOU BEEN UNABLE TO REMEMBER WHAT HAPPENED THE NIGHT BEFORE BECAUSE YOU HAD BEEN DRINKING: 0
HOW MANY STANDARD DRINKS CONTAINING ALCOHOL DO YOU HAVE ON A TYPICAL DAY: ONE OR TWO
HAVE YOU OR SOMEONE ELSE BEEN INJURED AS A RESULT OF YOUR DRINKING: 0
HOW OFTEN DO YOU HAVE SIX OR MORE DRINKS ON ONE OCCASION: 0
HOW OFTEN DURING THE LAST YEAR HAVE YOU HAD A FEELING OF GUILT OR REMORSE AFTER DRINKING: NEVER
HOW OFTEN DURING THE LAST YEAR HAVE YOU FOUND THAT YOU WERE NOT ABLE TO STOP DRINKING ONCE YOU HAD STARTED: 0
HOW OFTEN DO YOU HAVE A DRINK CONTAINING ALCOHOL: MONTHLY OR LESS

## 2021-09-24 ASSESSMENT — PATIENT HEALTH QUESTIONNAIRE - PHQ9
SUM OF ALL RESPONSES TO PHQ9 QUESTIONS 1 & 2: 0
2. FEELING DOWN, DEPRESSED OR HOPELESS: 0
1. LITTLE INTEREST OR PLEASURE IN DOING THINGS: 0
SUM OF ALL RESPONSES TO PHQ QUESTIONS 1-9: 0

## 2021-09-30 ENCOUNTER — OFFICE VISIT (OUTPATIENT)
Dept: FAMILY MEDICINE CLINIC | Age: 66
End: 2021-09-30
Payer: MEDICARE

## 2021-09-30 VITALS
OXYGEN SATURATION: 97 % | TEMPERATURE: 96.9 F | HEART RATE: 74 BPM | SYSTOLIC BLOOD PRESSURE: 138 MMHG | DIASTOLIC BLOOD PRESSURE: 88 MMHG | HEIGHT: 69 IN | WEIGHT: 293 LBS | BODY MASS INDEX: 43.4 KG/M2

## 2021-09-30 DIAGNOSIS — E66.01 OBESITY, CLASS III, BMI 40-49.9 (MORBID OBESITY) (HCC): ICD-10-CM

## 2021-09-30 DIAGNOSIS — E78.00 PURE HYPERCHOLESTEROLEMIA: ICD-10-CM

## 2021-09-30 DIAGNOSIS — J45.20 MILD INTERMITTENT ASTHMA WITHOUT COMPLICATION: ICD-10-CM

## 2021-09-30 DIAGNOSIS — I10 ESSENTIAL HYPERTENSION: ICD-10-CM

## 2021-09-30 DIAGNOSIS — R73.03 PREDIABETES: ICD-10-CM

## 2021-09-30 DIAGNOSIS — G89.29 CHRONIC PAIN OF RIGHT KNEE: ICD-10-CM

## 2021-09-30 DIAGNOSIS — M17.11 PRIMARY OSTEOARTHRITIS OF RIGHT KNEE: ICD-10-CM

## 2021-09-30 DIAGNOSIS — Z00.00 ROUTINE GENERAL MEDICAL EXAMINATION AT A HEALTH CARE FACILITY: Primary | ICD-10-CM

## 2021-09-30 DIAGNOSIS — M25.561 CHRONIC PAIN OF RIGHT KNEE: ICD-10-CM

## 2021-09-30 PROCEDURE — 4040F PNEUMOC VAC/ADMIN/RCVD: CPT | Performed by: FAMILY MEDICINE

## 2021-09-30 PROCEDURE — 99213 OFFICE O/P EST LOW 20 MIN: CPT | Performed by: FAMILY MEDICINE

## 2021-09-30 PROCEDURE — 1123F ACP DISCUSS/DSCN MKR DOCD: CPT | Performed by: FAMILY MEDICINE

## 2021-09-30 PROCEDURE — G0402 INITIAL PREVENTIVE EXAM: HCPCS | Performed by: FAMILY MEDICINE

## 2021-09-30 PROCEDURE — 3017F COLORECTAL CA SCREEN DOC REV: CPT | Performed by: FAMILY MEDICINE

## 2021-09-30 RX ORDER — LISINOPRIL AND HYDROCHLOROTHIAZIDE 25; 20 MG/1; MG/1
1 TABLET ORAL DAILY
Qty: 90 TABLET | Refills: 3 | Status: SHIPPED | OUTPATIENT
Start: 2021-09-30 | End: 2021-10-26

## 2021-09-30 RX ORDER — MELOXICAM 15 MG/1
15 TABLET ORAL DAILY
Qty: 90 TABLET | Refills: 3 | Status: SHIPPED | OUTPATIENT
Start: 2021-09-30 | End: 2022-05-04

## 2021-09-30 RX ORDER — PRAVASTATIN SODIUM 20 MG
20 TABLET ORAL DAILY
Qty: 90 TABLET | Refills: 3 | Status: SHIPPED | OUTPATIENT
Start: 2021-09-30 | End: 2022-09-20 | Stop reason: SDUPTHER

## 2021-09-30 ASSESSMENT — PATIENT HEALTH QUESTIONNAIRE - PHQ9
2. FEELING DOWN, DEPRESSED OR HOPELESS: 0
SUM OF ALL RESPONSES TO PHQ QUESTIONS 1-9: 0
SUM OF ALL RESPONSES TO PHQ QUESTIONS 1-9: 0
1. LITTLE INTEREST OR PLEASURE IN DOING THINGS: 0
SUM OF ALL RESPONSES TO PHQ QUESTIONS 1-9: 0
SUM OF ALL RESPONSES TO PHQ9 QUESTIONS 1 & 2: 0

## 2021-09-30 NOTE — PROGRESS NOTES
Medicare Annual Wellness Visit  Name: Hanna Cody Date: 2021   MRN: [id number] Sex: Female   Age: 77 y.o. Ethnicity: Non- / Non    : 1955 Race: White (non-)      Sherry Finney is here for Medicare AWV (questions on shingles vaccination) and Hypertension    Screenings for behavioral, psychosocial and functional/safety risks, and cognitive dysfunction are all negative except as indicated below. These results, as well as other patient data from the [address] form, are documented in Flowsheets linked to this Encounter. HTN -- lisinopril --HCTZ   Asthma is better -- Symbicort use 2 puffs in am only x 1 usually helps. Albuterol use not currently. Not a lot of physical activity normal.  Had bronchitis with July albuterol given. Then in August, symbicort given     Is planing on not getting Covid19 vaccine due to exposure to covid in  and she did fine. Discussed waxing immunity after many months of exposure and encouraged vaccine. She will get flu shot but wants to wait. Would like to try something different for her knee. Injections were not helpful.  voltaren not enough. Would like surgery which is her next option but her  is dealing with cancer and doing visits out of town. Not a good time.      Lab Results   Component Value Date    LABA1C 5.8 2021     Lab Results   Component Value Date     2015     Lab Results   Component Value Date    WBC 5.7 2021    HGB 12.5 2021    HCT 40.1 2021    MCV 97.1 2021     2021       Lab Results   Component Value Date    CHOL 183 2021    CHOL 168 10/06/2020    CHOL 184 2019     Lab Results   Component Value Date    TRIG 129 2021    TRIG 118 10/06/2020    TRIG 113 2019     Lab Results   Component Value Date    HDL 43 2021    HDL 41 10/06/2020    HDL 46 2019     Lab Results   Component Value Date    LDLCALC 114 09/20/2021    LDLCALC 103 10/06/2020    LDLCALC 115 (H) 09/28/2019     Lab Results   Component Value Date    VLDL 22 09/28/2019    VLDL 27 09/29/2018    VLDL 19 09/30/2017     No results found for: [facility]    Lab Results   Component Value Date     09/20/2021    K 4.1 09/20/2021     09/20/2021    CO2 26 09/20/2021    BUN 25 09/20/2021    CREATININE 0.9 09/20/2021    GLUCOSE 98 09/20/2021    GLUCOSE 95 09/28/2019    CALCIUM 9.3 09/20/2021          Allergies   Allergen Reactions    Cefdinir      rash    Amoxicillin Rash         Prior to Visit Medications    Medication Sig Taking? Authorizing Provider   lisinopril-hydroCHLOROthiazide (PRINZIDE;ZESTORETIC) 20-25 MG per tablet Take 1 tablet by mouth daily Yes Margaret Urias MD   pravastatin (PRAVACHOL) 20 MG tablet Take 1 tablet by mouth daily Yes Margaret Urias MD   meloxicam (MOBIC) 15 MG tablet Take 1 tablet by mouth daily Yes Margaret Urias MD   budesonide-formoterol (SYMBICORT) 160-4.5 MCG/ACT AERO Inhale 2 puffs into the lungs 2 times daily Yes Margaret Urias MD   albuterol sulfate (PROAIR RESPICLICK) 559 (90 Base) MCG/ACT aerosol powder inhalation Inhale 2 puffs into the lungs every 6 hours as needed for Wheezing or Shortness of Breath Yes Margaret Urias MD   Multiple Vitamins-Minerals (WOMENS MULTI VITAMIN & MINERAL PO) Take 1 tablet by mouth daily. Yes Historical Provider, MD   Calcium Polycarbophil (FIBERCON PO) Take 1 tablet by mouth 2 times daily. Yes Historical Provider, MD   Omeprazole Magnesium (PRILOSEC OTC PO) Take 1 tablet by mouth daily.  Yes Historical Provider, MD         Past Medical History:   Diagnosis Date    Allergic rhinitis     Asthma     Hypertension     Irritable bowel syndrome        Past Surgical History:   Procedure Laterality Date    HYSTERECTOMY      OH BX OF BREAST, NEEDLE CORE, IMAGE GUIDE Right 2011 2011-benign per pt    TONSILLECTOMY AND ADENOIDECTOMY           Family History Problem Relation Age of Onset    Diabetes Father     Cancer Father     Asthma Father     COPD Father     Stroke Maternal Grandmother     Stroke Maternal Grandfather     Arthritis Mother        CareTeam (Including outside providers/suppliers regularly involved in providing care):   Patient Care Team:  Al Escobar MD as PCP - General (Family Medicine)  Al Escobar MD as PCP - [facility] Empaneled Provider    Wt Readings from Last 3 Encounters:   09/30/21 300 lb (136.1 kg)   07/30/21 298 lb (135.2 kg)   03/31/21 299 lb (135.6 kg)     Vitals:    09/30/21 1242   BP: 138/88   Site: Left Upper Arm   Position: Sitting   Cuff Size: Large Adult   Pulse: 74   Temp: 96.9 °F (36.1 °C)   TempSrc: Skin   SpO2: 97%   Weight: 300 lb (136.1 kg)   Height: 5' 9\" (1.753 m)     Body mass index is 44.3 kg/m². Based upon direct observation of the patient, evaluation of cognition reveals recent and remote memory intact. General Appearance: alert and oriented to person, place and time, well-developed and well-nourished, in no acute distress  Pulmonary/Chest: wheezing end of expiration  Cardiovascular: normal rate and normal S1 and S2    Patient's complete Health Risk Assessment and screening values have been reviewed and are found in Flowsheets. The following problems were reviewed today and where indicated follow up appointments were made and/or referrals ordered. Positive Risk Factor Screenings with Interventions:           Health Habits/Nutrition:  Health Habits/Nutrition  Do you exercise for at least 20 minutes 2-3 times per week?: (!) No  Have you lost any weight without trying in the past 3 months?: No  Do you eat only one meal per day?: No  Have you seen the dentist within the past year?: Yes  Body mass index: (!) 44.3  Health Habits/Nutrition Interventions:  · Inadequate physical activity:  sitting exercises  · Nutritional issues:  encouraged healthy diet.   may need intermittent fasting to help with weight loss     Safety:  Safety  Do you have working smoke detectors?: Yes  Have all throw rugs been removed or fastened?: (!) No  Do you have non-slip mats or surfaces in all bathtubs/showers?: (!) No  Do all of your stairways have a railing or banister?: (!) No  Are your doorways, halls and stairs free of clutter?: Yes  Do you always fasten your seatbelt when you are in a car?: Yes  Safety Interventions:  · discussed home safety tips     Personalized Preventive Plan   Current Health Maintenance Status  Immunization History   Administered Date(s) Administered    Influenza Vaccine, unspecified formulation 10/22/2016    Influenza Virus Vaccine 10/11/2019    Influenza, Candice Rj, IM, (6 mo and older Fluzone, Flulaval, Fluarix and 3 yrs and older Afluria) 10/26/2018    Influenza, Quadv, adjuvanted, 65 yrs +, IM, PF (Fluad) 10/22/2020    Pneumococcal Polysaccharide ([id number]) 10/22/2020    Tetanus Toxoid, absorbed 08/11/2010        Health Maintenance   Topic Date Due    COVID-19 Vaccine (1) Never done    DTaP/Tdap/Td vaccine (1 - Tdap) Never done    Shingles Vaccine (1 of 2) Never done    Flu vaccine (1) 09/01/2021    Annual Wellness Visit (AWV)  Never done    A1C test (Diabetic or Prediabetic)  09/20/2022    Lipid screen  09/20/2022    Potassium monitoring  09/20/2022    Creatinine monitoring  09/20/2022    Breast cancer screen  05/26/2023    Colon cancer screen colonoscopy  12/08/2027    DEXA (modify frequency per FRAX score)  Completed    Pneumococcal 65+ years Vaccine  Completed    Hepatitis A vaccine  Aged Out    Hepatitis B vaccine  Aged Out    Hib vaccine  Aged Out    Meningococcal (ACWY) vaccine  Aged Out    Hepatitis C screen  Discontinued     Recommendations for Dental [employer]z Due: see orders and patient instructions/AVS.  .   Recommended screening schedule for the next 5-10 years is provided to the patient in written form: see Patient Instructions/AVS.    Silver Baez was seen today for medicare awv and hypertension. Diagnoses and all orders for this visit:    Routine general medical examination at a health care facility    Essential hypertension  -     lisinopril-hydroCHLOROthiazide (PRINZIDE;ZESTORETIC) 20-25 MG per tablet; Take 1 tablet by mouth daily    Pure hypercholesterolemia  -     pravastatin (PRAVACHOL) 20 MG tablet; Take 1 tablet by mouth daily    Obesity, Class III, BMI 40-49.9 (morbid obesity) (HCC)    Mild intermittent asthma without complication    Chronic pain of right knee  -     meloxicam (MOBIC) 15 MG tablet; Take 1 tablet by mouth daily    Primary osteoarthritis of right knee  -     meloxicam (MOBIC) 15 MG tablet; Take 1 tablet by mouth daily               Return in 6 months (on 3/30/2022).   Future Appointments   Date Time Provider Department Center   3/30/2022  9:20 MD Dante Da Silva - Breana Leal MD

## 2021-10-18 DIAGNOSIS — J45.901 MODERATE ASTHMA WITH ACUTE EXACERBATION, UNSPECIFIED WHETHER PERSISTENT: ICD-10-CM

## 2021-10-19 RX ORDER — BUDESONIDE AND FORMOTEROL FUMARATE DIHYDRATE 160; 4.5 UG/1; UG/1
2 AEROSOL RESPIRATORY (INHALATION) 2 TIMES DAILY
Qty: 3 EACH | Refills: 3 | Status: SHIPPED | OUTPATIENT
Start: 2021-10-19 | End: 2021-10-20 | Stop reason: SDUPTHER

## 2021-10-20 DIAGNOSIS — J45.901 MODERATE ASTHMA WITH ACUTE EXACERBATION, UNSPECIFIED WHETHER PERSISTENT: ICD-10-CM

## 2021-10-20 RX ORDER — BUDESONIDE AND FORMOTEROL FUMARATE DIHYDRATE 160; 4.5 UG/1; UG/1
2 AEROSOL RESPIRATORY (INHALATION) 2 TIMES DAILY
Qty: 3 EACH | Refills: 3 | Status: SHIPPED | OUTPATIENT
Start: 2021-10-20 | End: 2022-09-20 | Stop reason: SDUPTHER

## 2021-10-25 DIAGNOSIS — I10 ESSENTIAL HYPERTENSION: ICD-10-CM

## 2021-10-25 DIAGNOSIS — J18.9 PNEUMONIA OF RIGHT LOWER LOBE DUE TO INFECTIOUS ORGANISM: ICD-10-CM

## 2021-10-25 NOTE — TELEPHONE ENCOUNTER
Last visit- 9/30/2021  Next visit- 3/30/2022    Requested Prescriptions     Pending Prescriptions Disp Refills    albuterol sulfate  (90 Base) MCG/ACT inhaler [Pharmacy Med Name: ALBUTEROL HFA INHALER 8.5GM 90MCG] 25.5 g 3     Sig: USE 2 INHALATIONS EVERY 6 HOURS AS NEEDED FOR WHEEZING OR SHORTNESS OF BREATH    lisinopril-hydroCHLOROthiazide (PRINZIDE;ZESTORETIC) 20-25 MG per tablet [Pharmacy Med Name: LISINOPRIL/HCTZ TABS 20/25MG] 90 tablet 3     Sig: TAKE 1 TABLET DAILY

## 2021-10-26 RX ORDER — ALBUTEROL SULFATE 90 UG/1
AEROSOL, METERED RESPIRATORY (INHALATION)
Qty: 25.5 G | Refills: 3 | Status: SHIPPED | OUTPATIENT
Start: 2021-10-26 | End: 2022-09-20 | Stop reason: SDUPTHER

## 2021-10-26 RX ORDER — LISINOPRIL AND HYDROCHLOROTHIAZIDE 25; 20 MG/1; MG/1
TABLET ORAL
Qty: 90 TABLET | Refills: 3 | Status: SHIPPED | OUTPATIENT
Start: 2021-10-26 | End: 2022-09-20 | Stop reason: SDUPTHER

## 2022-03-14 ENCOUNTER — NURSE ONLY (OUTPATIENT)
Dept: FAMILY MEDICINE CLINIC | Age: 67
End: 2022-03-14
Payer: MEDICARE

## 2022-03-14 DIAGNOSIS — R05.9 COUGH: Primary | ICD-10-CM

## 2022-03-14 LAB
Lab: NORMAL
QC PASS/FAIL: NORMAL
SARS-COV-2 RDRP RESP QL NAA+PROBE: NEGATIVE

## 2022-03-14 PROCEDURE — 87635 SARS-COV-2 COVID-19 AMP PRB: CPT | Performed by: FAMILY MEDICINE

## 2022-03-14 NOTE — PROGRESS NOTES
Joshua Gonzales arrived at our office for a drive-up [id number] test swab to be obtained. Specimen was collected with no complications and processed in this clinic's lab. Results were Negative for COVID-19. Patient advised of results.

## 2022-03-17 ENCOUNTER — OFFICE VISIT (OUTPATIENT)
Dept: FAMILY MEDICINE CLINIC | Age: 67
End: 2022-03-17
Payer: MEDICARE

## 2022-03-17 VITALS — SYSTOLIC BLOOD PRESSURE: 118 MMHG | DIASTOLIC BLOOD PRESSURE: 78 MMHG | HEART RATE: 74 BPM | RESPIRATION RATE: 14 BRPM

## 2022-03-17 DIAGNOSIS — J40 BRONCHITIS: Primary | ICD-10-CM

## 2022-03-17 PROCEDURE — G8400 PT W/DXA NO RESULTS DOC: HCPCS | Performed by: NURSE PRACTITIONER

## 2022-03-17 PROCEDURE — 3017F COLORECTAL CA SCREEN DOC REV: CPT | Performed by: NURSE PRACTITIONER

## 2022-03-17 PROCEDURE — G8427 DOCREV CUR MEDS BY ELIG CLIN: HCPCS | Performed by: NURSE PRACTITIONER

## 2022-03-17 PROCEDURE — G8417 CALC BMI ABV UP PARAM F/U: HCPCS | Performed by: NURSE PRACTITIONER

## 2022-03-17 PROCEDURE — 1036F TOBACCO NON-USER: CPT | Performed by: NURSE PRACTITIONER

## 2022-03-17 PROCEDURE — 4040F PNEUMOC VAC/ADMIN/RCVD: CPT | Performed by: NURSE PRACTITIONER

## 2022-03-17 PROCEDURE — G8484 FLU IMMUNIZE NO ADMIN: HCPCS | Performed by: NURSE PRACTITIONER

## 2022-03-17 PROCEDURE — 1090F PRES/ABSN URINE INCON ASSESS: CPT | Performed by: NURSE PRACTITIONER

## 2022-03-17 PROCEDURE — 99213 OFFICE O/P EST LOW 20 MIN: CPT | Performed by: NURSE PRACTITIONER

## 2022-03-17 PROCEDURE — 1123F ACP DISCUSS/DSCN MKR DOCD: CPT | Performed by: NURSE PRACTITIONER

## 2022-03-17 RX ORDER — PREDNISONE 20 MG/1
TABLET ORAL
Qty: 15 TABLET | Refills: 0 | Status: SHIPPED | OUTPATIENT
Start: 2022-03-17 | End: 2022-05-04

## 2022-03-17 RX ORDER — AZITHROMYCIN 250 MG/1
250 TABLET, FILM COATED ORAL DAILY
Qty: 1 PACKET | Refills: 0 | Status: SHIPPED | OUTPATIENT
Start: 2022-03-17 | End: 2022-05-04

## 2022-03-17 SDOH — ECONOMIC STABILITY: FOOD INSECURITY: WITHIN THE PAST 12 MONTHS, THE FOOD YOU BOUGHT JUST DIDN'T LAST AND YOU DIDN'T HAVE MONEY TO GET MORE.: NEVER TRUE

## 2022-03-17 SDOH — ECONOMIC STABILITY: FOOD INSECURITY: WITHIN THE PAST 12 MONTHS, YOU WORRIED THAT YOUR FOOD WOULD RUN OUT BEFORE YOU GOT MONEY TO BUY MORE.: NEVER TRUE

## 2022-03-17 ASSESSMENT — ENCOUNTER SYMPTOMS
CHEST TIGHTNESS: 1
VOMITING: 0
NAUSEA: 0
SINUS PRESSURE: 0
COUGH: 1
SHORTNESS OF BREATH: 1
WHEEZING: 1

## 2022-03-17 ASSESSMENT — SOCIAL DETERMINANTS OF HEALTH (SDOH): HOW HARD IS IT FOR YOU TO PAY FOR THE VERY BASICS LIKE FOOD, HOUSING, MEDICAL CARE, AND HEATING?: NOT HARD AT ALL

## 2022-03-17 NOTE — PATIENT INSTRUCTIONS
Patient Education        Bronchitis: Care Instructions  Your Care Instructions     Bronchitis is inflammation of the bronchial tubes, which carry air to the lungs. The tubes swell and produce mucus, or phlegm. The mucus and inflamed bronchial tubes make you cough. You may have trouble breathing. Most cases of bronchitis are caused by viruses like those that cause colds. Antibiotics usually do not help and they may be harmful. Bronchitis usually develops rapidly and lasts about 2 to 3 weeks in otherwise healthy people. Follow-up care is a key part of your treatment and safety. Be sure to make and go to all appointments, and call your doctor if you are having problems. It's also a good idea to know your test results and keep a list of the medicines you take. How can you care for yourself at home? · Take all medicines exactly as prescribed. Call your doctor if you think you are having a problem with your medicine. · Get some extra rest.  · Take an over-the-counter pain medicine, such as acetaminophen (Tylenol), ibuprofen (Advil, Motrin), or naproxen (Aleve) to reduce fever and relieve body aches. Read and follow all instructions on the label. · Do not take two or more pain medicines at the same time unless the doctor told you to. Many pain medicines have acetaminophen, which is Tylenol. Too much acetaminophen (Tylenol) can be harmful. · Take an over-the-counter cough medicine to help quiet a dry, hacking cough so that you can sleep. Avoid cough medicines that have more than one active ingredient. Read and follow all instructions on the label. · Do not smoke. Smoking can make bronchitis worse. If you need help quitting, talk to your doctor about stop-smoking programs and medicines. These can increase your chances of quitting for good. When should you call for help? Call 911 anytime you think you may need emergency care. For example, call if:    · You have severe trouble breathing.    Call your doctor now or seek immediate medical care if:    · You have new or worse trouble breathing.     · You cough up dark brown or bloody mucus (sputum).     · You have a new or higher fever.     · You have a new rash. Watch closely for changes in your health, and be sure to contact your doctor if:    · You cough more deeply or more often, especially if you notice more mucus or a change in the color of your mucus.     · You are not getting better as expected. Where can you learn more? Go to [email or web address]://[email or web address]. org and sign in to your [employer] account. Enter H333 in the DocSend box to learn more about \"Bronchitis: Care Instructions. \"     If you do not have an account, please click on the \"Sign Up Now\" link. Current as of: July 6, 2021               Content Version: 13.1  © 4867-4172 [employer]wise, Incorporated. Care instructions adapted under license by [facility] If you have questions about a medical condition or this instruction, always ask your healthcare professional. [address] any warranty or liability for your use of this information.

## 2022-03-17 NOTE — PROGRESS NOTES
Salazar Ortiz (:  1955) is a 77 y.o. female,Established patient, here for evaluation of the following chief complaint(s):  Cough (persistent, dry, wheezing )         ASSESSMENT/PLAN:  1. Bronchitis  - Acute  - Start antibiotic and steroid treatments  - Encouraged prn use of albuterol  - OTC cough suppressants as needed  -     azithromycin (ZITHROMAX) 250 MG tablet; Take 1 tablet by mouth daily Day 1 take 2 tablets. Days 2-5 take 1 tablet., Disp-1 packet, R-0Normal  -     predniSONE (DELTASONE) 20 MG tablet; Take 1 tablet twice daily for five days, then take 1 tablet once a day for 5 days. , Disp-15 tablet, R-0Normal      Return if symptoms worsen or fail to improve. Subjective   SUBJECTIVE/OBJECTIVE:  Cough  This is a new problem. The current episode started in the past 7 days. The problem has been gradually improving. The cough is productive of sputum. Associated symptoms include shortness of breath (exertional) and wheezing. Pertinent negatives include no fever (resolved) or postnasal drip. Nothing aggravates the symptoms. She has tried nothing for the symptoms. Her past medical history is significant for asthma. Review of Systems   Constitutional: Negative for fever (resolved). HENT: Negative for congestion, postnasal drip and sinus pressure. Respiratory: Positive for cough, chest tightness, shortness of breath (exertional) and wheezing. Gastrointestinal: Negative for nausea and vomiting. Objective   Physical Exam  Vitals and nursing note reviewed. Constitutional:       Appearance: She is well-developed. HENT:      Head: Normocephalic and atraumatic. Right Ear: Hearing, tympanic membrane, ear canal and external ear normal.      Left Ear: Hearing, tympanic membrane, ear canal and external ear normal.      Nose:      Right Sinus: No maxillary sinus tenderness or frontal sinus tenderness. Left Sinus: No maxillary sinus tenderness or frontal sinus tenderness. Eyes:      General:         Right eye: No discharge. Left eye: No discharge. Conjunctiva/sclera: Conjunctivae normal.      Pupils: Pupils are equal, round, and reactive to light. Neck:      Vascular: No JVD. Cardiovascular:      Rate and Rhythm: Normal rate and regular rhythm. Heart sounds: Normal heart sounds. No murmur heard. Pulmonary:      Effort: Pulmonary effort is normal. No tachypnea. Breath sounds: No stridor. Wheezing present. Abdominal:      General: There is no distension. Tenderness: There is no abdominal tenderness. Musculoskeletal:         General: No deformity. Cervical back: Normal range of motion. Comments: ROM in all extremities WNL. No deformities. Lymphadenopathy:      Head:      Right side of head: No submental or submandibular adenopathy. Left side of head: No submental or submandibular adenopathy. Skin:     General: Skin is warm and dry. Capillary Refill: Capillary refill takes less than 2 seconds. Findings: No rash. Neurological:      Mental Status: She is alert and oriented to person, place, and time. Coordination: Coordination normal.   Psychiatric:         Mood and Affect: Mood normal.         Behavior: Behavior normal.         Thought Content: Thought content normal.         Judgment: Judgment normal.                  An electronic signature was used to authenticate this note.     --JEFF Taylor - CNP

## 2022-05-04 ENCOUNTER — OFFICE VISIT (OUTPATIENT)
Dept: FAMILY MEDICINE CLINIC | Age: 67
End: 2022-05-04
Payer: MEDICARE

## 2022-05-04 VITALS
OXYGEN SATURATION: 99 % | WEIGHT: 293 LBS | TEMPERATURE: 97.8 F | SYSTOLIC BLOOD PRESSURE: 132 MMHG | HEIGHT: 69 IN | BODY MASS INDEX: 43.4 KG/M2 | DIASTOLIC BLOOD PRESSURE: 72 MMHG | HEART RATE: 83 BPM

## 2022-05-04 DIAGNOSIS — R73.03 PREDIABETES: ICD-10-CM

## 2022-05-04 DIAGNOSIS — M17.11 PRIMARY OSTEOARTHRITIS OF RIGHT KNEE: ICD-10-CM

## 2022-05-04 DIAGNOSIS — E78.00 PURE HYPERCHOLESTEROLEMIA: ICD-10-CM

## 2022-05-04 DIAGNOSIS — L81.9 PIGMENTED SKIN LESION: ICD-10-CM

## 2022-05-04 DIAGNOSIS — I10 PRIMARY HYPERTENSION: Primary | ICD-10-CM

## 2022-05-04 DIAGNOSIS — J45.20 MILD INTERMITTENT ASTHMA WITHOUT COMPLICATION: ICD-10-CM

## 2022-05-04 DIAGNOSIS — G47.33 OSA (OBSTRUCTIVE SLEEP APNEA): ICD-10-CM

## 2022-05-04 PROCEDURE — 1123F ACP DISCUSS/DSCN MKR DOCD: CPT | Performed by: FAMILY MEDICINE

## 2022-05-04 PROCEDURE — 4040F PNEUMOC VAC/ADMIN/RCVD: CPT | Performed by: FAMILY MEDICINE

## 2022-05-04 PROCEDURE — 1090F PRES/ABSN URINE INCON ASSESS: CPT | Performed by: FAMILY MEDICINE

## 2022-05-04 PROCEDURE — G8417 CALC BMI ABV UP PARAM F/U: HCPCS | Performed by: FAMILY MEDICINE

## 2022-05-04 PROCEDURE — 1036F TOBACCO NON-USER: CPT | Performed by: FAMILY MEDICINE

## 2022-05-04 PROCEDURE — G8400 PT W/DXA NO RESULTS DOC: HCPCS | Performed by: FAMILY MEDICINE

## 2022-05-04 PROCEDURE — 3017F COLORECTAL CA SCREEN DOC REV: CPT | Performed by: FAMILY MEDICINE

## 2022-05-04 PROCEDURE — 99214 OFFICE O/P EST MOD 30 MIN: CPT | Performed by: FAMILY MEDICINE

## 2022-05-04 PROCEDURE — G8427 DOCREV CUR MEDS BY ELIG CLIN: HCPCS | Performed by: FAMILY MEDICINE

## 2022-05-04 RX ORDER — DICLOFENAC SODIUM 75 MG/1
75 TABLET, DELAYED RELEASE ORAL 2 TIMES DAILY
COMMUNITY
End: 2022-05-04 | Stop reason: SDUPTHER

## 2022-05-04 RX ORDER — DICLOFENAC SODIUM 75 MG/1
75 TABLET, DELAYED RELEASE ORAL 2 TIMES DAILY
Qty: 180 TABLET | Refills: 3 | Status: SHIPPED | OUTPATIENT
Start: 2022-05-04

## 2022-05-04 ASSESSMENT — ENCOUNTER SYMPTOMS: SHORTNESS OF BREATH: 0

## 2022-05-04 NOTE — PROGRESS NOTES
Alka Chin (:  1955) is a 79 y.o. female,Established patient, here for evaluation of the following chief complaint(s):  6 Month Follow-Up, Hypotension, and Hypertension         ASSESSMENT/PLAN:  1. Primary hypertension  -     CBC with Auto Differential; Future  -     Comprehensive Metabolic Panel; Future  -     Hemoglobin A1C; Future  -     Lipid Panel; Future  -     Magnesium; Future  -     TSH with Reflex; Future  2. Body mass index (BMI) 45.0-49.9, adult (HCC)  -     CBC with Auto Differential; Future  -     Comprehensive Metabolic Panel; Future  -     Hemoglobin A1C; Future  -     Lipid Panel; Future  -     Magnesium; Future  -     TSH with Reflex; Future  3. Mild intermittent asthma without complication  -     CBC with Auto Differential; Future  -     Comprehensive Metabolic Panel; Future  -     Hemoglobin A1C; Future  -     Lipid Panel; Future  -     Magnesium; Future  -     TSH with Reflex; Future  4. Pure hypercholesterolemia  -     CBC with Auto Differential; Future  -     Comprehensive Metabolic Panel; Future  -     Hemoglobin A1C; Future  -     Lipid Panel; Future  -     Magnesium; Future  -     TSH with Reflex; Future  5. Prediabetes  -     CBC with Auto Differential; Future  -     Comprehensive Metabolic Panel; Future  -     Hemoglobin A1C; Future  -     Lipid Panel; Future  -     Magnesium; Future  -     TSH with Reflex; Future  6. SUSANNE (obstructive sleep apnea)  7. Primary osteoarthritis of right knee  -     diclofenac sodium (VOLTAREN) 1 % GEL; Apply 2 g topically 4 times daily Right knee, Topical, 4 TIMES DAILY Starting Wed 2022, Until Mon 10/31/2022, For 180 days, Disp-300 g, R-5, Normal  -     diclofenac (VOLTAREN) 75 MG EC tablet; Take 1 tablet by mouth 2 times daily, Disp-180 tablet, R-3Normal  8. Pigmented skin lesion above right eyebrow      Return in about 6 months (around 2022).          Subjective   SUBJECTIVE/OBJECTIVE:  HPI     Wants to get knee done but waiting for her husbands' health to be better. He is battling lung cancer. Tried OTC options. HtN, prediabetes, high cholesterol -- stable. Currently asthma not flaring up. Pigmented lesion -- around right eyebrow    Review of Systems   Constitutional: Negative for fatigue and fever. Respiratory: Negative for shortness of breath. Cardiovascular: Negative for chest pain and leg swelling. Wt Readings from Last 3 Encounters:   05/04/22 (!) 307 lb (139.3 kg)   09/30/21 300 lb (136.1 kg)   07/30/21 298 lb (135.2 kg)         Objective   Physical Exam  Constitutional:       General: She is not in acute distress. Appearance: Normal appearance. She is not ill-appearing. Cardiovascular:      Rate and Rhythm: Normal rate and regular rhythm. Heart sounds: No murmur heard. Pulmonary:      Effort: Pulmonary effort is normal. No respiratory distress. Breath sounds: Normal breath sounds. No wheezing. Musculoskeletal:         General: No swelling. Neurological:      Mental Status: She is alert and oriented to person, place, and time.    Psychiatric:         Mood and Affect: Mood normal.          Lab Results   Component Value Date    LABA1C 5.8 09/20/2021     Lab Results   Component Value Date     09/26/2015     Lab Results   Component Value Date    LABA1C 5.8 09/20/2021    LABA1C 5.7 10/06/2020    LABA1C 6.0 09/26/2015       Lab Results   Component Value Date     09/20/2021    K 4.1 09/20/2021     09/20/2021    CO2 26 09/20/2021    BUN 25 (H) 09/20/2021    CREATININE 0.9 09/20/2021    CALCIUM 9.3 09/20/2021    GLUCOSE 98 09/20/2021        Lab Results   Component Value Date    CHOL 183 09/20/2021    CHOL 168 10/06/2020    CHOL 184 09/28/2019     Lab Results   Component Value Date    TRIG 129 09/20/2021    TRIG 118 10/06/2020    TRIG 113 09/28/2019     Lab Results   Component Value Date    HDL 43 09/20/2021    HDL 41 10/06/2020    HDL 46 09/28/2019     Lab Results   Component Value Date    LDLCALC 114 09/20/2021    LDLCALC 103 10/06/2020    LDLCALC 115 (H) 09/28/2019     Lab Results   Component Value Date    VLDL 22 09/28/2019    VLDL 27 09/29/2018    VLDL 19 09/30/2017     No results found for: CHOLHDLRATIO    No results found for: Channlinden Walton    No results found for: TSH   No results found for: [id number]   No results found for: MG   Lab Results   Component Value Date    ALT 12 09/20/2021    AST 16 09/20/2021    ALKPHOS 103 09/20/2021    BILITOT 0.4 09/20/2021    BILIDIR 0.1 09/28/2019        Lab Results   Component Value Date    WBC 5.7 09/20/2021    HGB 12.5 09/20/2021    HCT 40.1 09/20/2021    MCV 97.1 09/20/2021     09/20/2021       An electronic signature was used to authenticate this note.     --Erlin Wallace MD

## 2022-05-17 ENCOUNTER — HOSPITAL ENCOUNTER (OUTPATIENT)
Age: 67
Discharge: HOME | End: 2022-05-17
Payer: MEDICARE

## 2022-05-17 DIAGNOSIS — E78.00 PURE HYPERCHOLESTEROLEMIA: ICD-10-CM

## 2022-05-17 DIAGNOSIS — I10 PRIMARY HYPERTENSION: ICD-10-CM

## 2022-05-17 DIAGNOSIS — R73.03 PREDIABETES: ICD-10-CM

## 2022-05-17 DIAGNOSIS — J45.20 MILD INTERMITTENT ASTHMA WITHOUT COMPLICATION: ICD-10-CM

## 2022-05-17 LAB
ALBUMIN SERPL-MCNC: 4.3 G/DL (ref 3.5–5.1)
ALP BLD-CCNC: 94 U/L (ref 38–126)
ALT SERPL-CCNC: 12 U/L (ref 11–66)
ANION GAP SERPL CALCULATED.3IONS-SCNC: 11 MEQ/L (ref 8–16)
AST SERPL-CCNC: 16 U/L (ref 5–40)
AVERAGE GLUCOSE: 114 MG/DL (ref 70–126)
BASOPHILS # BLD: 0.9 %
BASOPHILS ABSOLUTE: 0.1 THOU/MM3 (ref 0–0.1)
BILIRUB SERPL-MCNC: 0.4 MG/DL (ref 0.3–1.2)
BUN BLDV-MCNC: 23 MG/DL (ref 7–22)
CALCIUM SERPL-MCNC: 9.2 MG/DL (ref 8.5–10.5)
CHLORIDE BLD-SCNC: 103 MEQ/L (ref 98–111)
CHOLESTEROL, TOTAL: 175 MG/DL (ref 100–199)
CO2: 27 MEQ/L (ref 23–33)
CREAT SERPL-MCNC: 0.8 MG/DL (ref 0.4–1.2)
EOSINOPHIL # BLD: 14.2 %
EOSINOPHILS ABSOLUTE: 0.9 THOU/MM3 (ref 0–0.4)
ERYTHROCYTE [DISTWIDTH] IN BLOOD BY AUTOMATED COUNT: 13.2 % (ref 11.5–14.5)
ERYTHROCYTE [DISTWIDTH] IN BLOOD BY AUTOMATED COUNT: 47.5 FL (ref 35–45)
GFR SERPL CREATININE-BSD FRML MDRD: 72 ML/MIN/1.73M2
GLUCOSE BLD-MCNC: 91 MG/DL (ref 70–108)
HBA1C MFR BLD: 5.8 % (ref 4.4–6.4)
HCT VFR BLD CALC: 38.1 % (ref 37–47)
HDLC SERPL-MCNC: 41 MG/DL
HEMOGLOBIN: 12 GM/DL (ref 12–16)
IMMATURE GRANS (ABS): 0.02 THOU/MM3 (ref 0–0.07)
IMMATURE GRANULOCYTES: 0.3 %
LDL CHOLESTEROL CALCULATED: 111 MG/DL
LYMPHOCYTES # BLD: 22.3 %
LYMPHOCYTES ABSOLUTE: 1.5 THOU/MM3 (ref 1–4.8)
MAGNESIUM: 2 MG/DL (ref 1.6–2.4)
MCH RBC QN AUTO: 30.5 PG (ref 26–33)
MCHC RBC AUTO-ENTMCNC: 31.5 GM/DL (ref 32.2–35.5)
MCV RBC AUTO: 96.9 FL (ref 81–99)
MONOCYTES # BLD: 6.7 %
MONOCYTES ABSOLUTE: 0.4 THOU/MM3 (ref 0.4–1.3)
NUCLEATED RED BLOOD CELLS: 0 /100 WBC
PLATELET # BLD: 305 THOU/MM3 (ref 130–400)
PMV BLD AUTO: 10.5 FL (ref 9.4–12.4)
POTASSIUM SERPL-SCNC: 4.7 MEQ/L (ref 3.5–5.2)
RBC # BLD: 3.93 MILL/MM3 (ref 4.2–5.4)
SEG NEUTROPHILS: 55.6 %
SEGMENTED NEUTROPHILS ABSOLUTE COUNT: 3.7 THOU/MM3 (ref 1.8–7.7)
SODIUM BLD-SCNC: 141 MEQ/L (ref 135–145)
TOTAL PROTEIN: 6.5 G/DL (ref 6.1–8)
TRIGL SERPL-MCNC: 116 MG/DL (ref 0–199)
TSH SERPL DL<=0.05 MIU/L-ACNC: 2.9 UIU/ML (ref 0.4–4.2)
WBC # BLD: 6.6 THOU/MM3 (ref 4.8–10.8)

## 2022-05-17 PROCEDURE — 85025 COMPLETE CBC W/AUTO DIFF WBC: CPT

## 2022-05-17 PROCEDURE — 84443 ASSAY THYROID STIM HORMONE: CPT

## 2022-05-17 PROCEDURE — 83735 ASSAY OF MAGNESIUM: CPT

## 2022-05-17 PROCEDURE — 80061 LIPID PANEL: CPT

## 2022-05-17 PROCEDURE — 80053 COMPREHEN METABOLIC PANEL: CPT

## 2022-05-17 PROCEDURE — 83036 HEMOGLOBIN GLYCOSYLATED A1C: CPT

## 2022-05-17 PROCEDURE — 36415 COLL VENOUS BLD VENIPUNCTURE: CPT

## 2022-06-03 ENCOUNTER — HOSPITAL ENCOUNTER (OUTPATIENT)
Dept: MAMMOGRAPHY | Age: 67
Discharge: HOME | End: 2022-06-03
Payer: MEDICARE

## 2022-06-03 DIAGNOSIS — Z12.31 VISIT FOR SCREENING MAMMOGRAM: ICD-10-CM

## 2022-06-03 PROCEDURE — 77063 BREAST TOMOSYNTHESIS BI: CPT

## 2022-06-07 ENCOUNTER — TELEPHONE (OUTPATIENT)
Dept: FAMILY MEDICINE CLINIC | Age: 67
End: 2022-06-07

## 2022-06-07 NOTE — TELEPHONE ENCOUNTER
Recd form for pre op eval.  Having right total knee replacement by Dr Nir Castro on 9/14/22 at [facility]. Preadmission testing being done on 8/12/22 at [facility]. Pt needs appt scheduled for this. Left message on pt's mach requesting that she call our office to schedule this appt. Form at [employer].

## 2022-06-10 ENCOUNTER — HOSPITAL ENCOUNTER (OUTPATIENT)
Dept: WOMENS IMAGING | Age: 67
Discharge: HOME | End: 2022-06-10
Payer: MEDICARE

## 2022-06-10 DIAGNOSIS — R92.2 BREAST DENSITY: ICD-10-CM

## 2022-06-10 PROCEDURE — G0279 TOMOSYNTHESIS, MAMMO: HCPCS

## 2022-06-10 PROCEDURE — 76642 ULTRASOUND BREAST LIMITED: CPT

## 2022-08-04 ENCOUNTER — HOSPITAL ENCOUNTER (OUTPATIENT)
Age: 67
Discharge: HOME | End: 2022-08-04
Payer: MEDICARE

## 2022-08-04 LAB
ERYTHROCYTE [DISTWIDTH] IN BLOOD BY AUTOMATED COUNT: 12.5 % (ref 11.5–14.5)
ERYTHROCYTE [DISTWIDTH] IN BLOOD BY AUTOMATED COUNT: 43.6 FL (ref 35–45)
HCT VFR BLD CALC: 37.7 % (ref 37–47)
HEMOGLOBIN: 12.2 GM/DL (ref 12–16)
MCH RBC QN AUTO: 30.9 PG (ref 26–33)
MCHC RBC AUTO-ENTMCNC: 32.4 GM/DL (ref 32.2–35.5)
MCV RBC AUTO: 95.4 FL (ref 81–99)
PLATELET # BLD: 324 THOU/MM3 (ref 130–400)
PMV BLD AUTO: 10.3 FL (ref 9.4–12.4)
RBC # BLD: 3.95 MILL/MM3 (ref 4.2–5.4)
WBC # BLD: 6.2 THOU/MM3 (ref 4.8–10.8)

## 2022-08-04 PROCEDURE — 80051 ELECTROLYTE PANEL: CPT

## 2022-08-04 PROCEDURE — 82947 ASSAY GLUCOSE BLOOD QUANT: CPT

## 2022-08-04 PROCEDURE — 85027 COMPLETE CBC AUTOMATED: CPT

## 2022-08-04 PROCEDURE — 84520 ASSAY OF UREA NITROGEN: CPT

## 2022-08-04 PROCEDURE — 93005 ELECTROCARDIOGRAM TRACING: CPT | Performed by: ORTHOPAEDIC SURGERY

## 2022-08-04 PROCEDURE — 36415 COLL VENOUS BLD VENIPUNCTURE: CPT

## 2022-08-04 PROCEDURE — 82565 ASSAY OF CREATININE: CPT

## 2022-08-05 LAB
ANION GAP SERPL CALCULATED.3IONS-SCNC: 10 MEQ/L (ref 8–16)
BUN BLDV-MCNC: 30 MG/DL (ref 7–22)
CHLORIDE BLD-SCNC: 103 MEQ/L (ref 98–111)
CO2: 28 MEQ/L (ref 23–33)
CREAT SERPL-MCNC: 1.2 MG/DL (ref 0.4–1.2)
EKG ATRIAL RATE: 80 BPM
EKG P AXIS: 53 DEGREES
EKG P-R INTERVAL: 148 MS
EKG Q-T INTERVAL: 384 MS
EKG QRS DURATION: 82 MS
EKG QTC CALCULATION (BAZETT): 442 MS
EKG R AXIS: 34 DEGREES
EKG T AXIS: 36 DEGREES
EKG VENTRICULAR RATE: 80 BPM
GFR SERPL CREATININE-BSD FRML MDRD: 45 ML/MIN/1.73M2
GLUCOSE BLD-MCNC: 126 MG/DL (ref 70–108)
POTASSIUM SERPL-SCNC: 4.3 MEQ/L (ref 3.5–5.2)
SODIUM BLD-SCNC: 141 MEQ/L (ref 135–145)

## 2022-08-05 PROCEDURE — 93010 ELECTROCARDIOGRAM REPORT: CPT | Performed by: INTERNAL MEDICINE

## 2022-08-24 ENCOUNTER — OFFICE VISIT (OUTPATIENT)
Dept: FAMILY MEDICINE CLINIC | Age: 67
End: 2022-08-24
Payer: MEDICARE

## 2022-08-24 VITALS
WEIGHT: 293 LBS | HEIGHT: 69 IN | DIASTOLIC BLOOD PRESSURE: 66 MMHG | HEART RATE: 82 BPM | RESPIRATION RATE: 16 BRPM | BODY MASS INDEX: 43.4 KG/M2 | SYSTOLIC BLOOD PRESSURE: 122 MMHG | OXYGEN SATURATION: 99 % | TEMPERATURE: 97.8 F

## 2022-08-24 DIAGNOSIS — R79.89 ELEVATED SERUM CREATININE: ICD-10-CM

## 2022-08-24 DIAGNOSIS — M25.561 CHRONIC PAIN OF RIGHT KNEE: ICD-10-CM

## 2022-08-24 DIAGNOSIS — M17.11 PRIMARY OSTEOARTHRITIS OF RIGHT KNEE: ICD-10-CM

## 2022-08-24 DIAGNOSIS — R73.03 PREDIABETES: ICD-10-CM

## 2022-08-24 DIAGNOSIS — Z01.818 PREOPERATIVE EXAMINATION: Primary | ICD-10-CM

## 2022-08-24 DIAGNOSIS — G47.33 OSA (OBSTRUCTIVE SLEEP APNEA): ICD-10-CM

## 2022-08-24 DIAGNOSIS — G89.29 CHRONIC PAIN OF RIGHT KNEE: ICD-10-CM

## 2022-08-24 DIAGNOSIS — I10 PRIMARY HYPERTENSION: ICD-10-CM

## 2022-08-24 DIAGNOSIS — J45.20 MILD INTERMITTENT ASTHMA WITHOUT COMPLICATION: ICD-10-CM

## 2022-08-24 PROCEDURE — 3017F COLORECTAL CA SCREEN DOC REV: CPT | Performed by: FAMILY MEDICINE

## 2022-08-24 PROCEDURE — G8427 DOCREV CUR MEDS BY ELIG CLIN: HCPCS | Performed by: FAMILY MEDICINE

## 2022-08-24 PROCEDURE — 1036F TOBACCO NON-USER: CPT | Performed by: FAMILY MEDICINE

## 2022-08-24 PROCEDURE — 1090F PRES/ABSN URINE INCON ASSESS: CPT | Performed by: FAMILY MEDICINE

## 2022-08-24 PROCEDURE — 1123F ACP DISCUSS/DSCN MKR DOCD: CPT | Performed by: FAMILY MEDICINE

## 2022-08-24 PROCEDURE — G8417 CALC BMI ABV UP PARAM F/U: HCPCS | Performed by: FAMILY MEDICINE

## 2022-08-24 PROCEDURE — 99214 OFFICE O/P EST MOD 30 MIN: CPT | Performed by: FAMILY MEDICINE

## 2022-08-24 PROCEDURE — G8400 PT W/DXA NO RESULTS DOC: HCPCS | Performed by: FAMILY MEDICINE

## 2022-08-24 ASSESSMENT — PATIENT HEALTH QUESTIONNAIRE - PHQ9
SUM OF ALL RESPONSES TO PHQ9 QUESTIONS 1 & 2: 0
SUM OF ALL RESPONSES TO PHQ QUESTIONS 1-9: 0
1. LITTLE INTEREST OR PLEASURE IN DOING THINGS: 0
2. FEELING DOWN, DEPRESSED OR HOPELESS: 0

## 2022-08-24 NOTE — PROGRESS NOTES
[facility]  [address]  [facility] [address]  Dept: [phone]  Loc: [phone]     SUBJECTIVE         Chief Complaint   Patient presents with    Pre-op Exam     Lab and EKG done 8/4/22       Pt presents for PRE-OP PE for planned right total knee arthroplasty. .  Surgery is scheduled with Dr. Shea toledo on 9/14/2022. Current symptoms include chronic pain made worse by most any activity. Previous therapy tried includes: Exercises, medications, injections. CURRENT EXERCISE REGIMEN:  Mets > 4 (light housework, climbing a flight of stairs) : Yes    Patient has history of sleep apnea, hypertension, intermittent asthma, prediabetes and BMI in the obese category. These conditions appear to be stable and they are optimized except for the body mass index. Making efforts to lose weight. Weight loss efforts have helped a little bit. She feels stuck and would like some more  especially she gets toward surgery and there after. Not Drinking a lot of water. PRIOR ANESTHESIA PROBLEMS? None  No family hx of anesthesia problem. No DVT/PE in personal or family history. No bleeding disorder in personal or famiy history.     Current medical problems include   Patient Active Problem List   Diagnosis    Primary hypertension    Irritable bowel syndrome    Pure hypercholesterolemia    Primary osteoarthritis of left knee    Gastroesophageal reflux disease without esophagitis    Mild intermittent asthma without complication    Prediabetes    Primary osteoarthritis of right knee    Chronic pain of right knee    SUSANNE (obstructive sleep apnea)       Past Medical History:   Diagnosis Date    Allergic rhinitis     Asthma     Hypertension     Irritable bowel syndrome        Past Surgical History:   Procedure Laterality Date    HYSTERECTOMY (CERVIX STATUS UNKNOWN)      VT BX OF BREAST, NEEDLE CORE, IMAGE GUIDE Right 2011 2011-benign per pt TONSILLECTOMY AND ADENOIDECTOMY         Allergies   Allergen Reactions    Cefdinir      rash    Amoxicillin Rash         Current Outpatient Medications:     diclofenac sodium (VOLTAREN) 1 % GEL, Apply 2 g topically 4 times daily Right knee, Disp: 300 g, Rfl: 5    diclofenac (VOLTAREN) 75 MG EC tablet, Take 1 tablet by mouth 2 times daily, Disp: 180 tablet, Rfl: 3    albuterol sulfate  (90 Base) MCG/ACT inhaler, USE 2 INHALATIONS EVERY 6 HOURS AS NEEDED FOR WHEEZING OR SHORTNESS OF BREATH, Disp: 25.5 g, Rfl: 3    lisinopril-hydroCHLOROthiazide (PRINZIDE;ZESTORETIC) 20-25 MG per tablet, TAKE 1 TABLET DAILY, Disp: 90 tablet, Rfl: 3    budesonide-formoterol (SYMBICORT) 160-4.5 MCG/ACT AERO, Inhale 2 puffs into the lungs 2 times daily, Disp: 3 each, Rfl: 3    pravastatin (PRAVACHOL) 20 MG tablet, Take 1 tablet by mouth daily, Disp: 90 tablet, Rfl: 3    Multiple Vitamins-Minerals (WOMENS MULTI VITAMIN & MINERAL PO), Take 1 tablet by mouth daily. , Disp: , Rfl:     Calcium Polycarbophil (FIBERCON PO), Take 1 tablet by mouth 2 times daily. , Disp: , Rfl:     Omeprazole Magnesium (PRILOSEC OTC PO), Take 1 tablet by mouth daily. , Disp: , Rfl:         Family History   Problem Relation Age of Onset    Diabetes Father     Cancer Father     Asthma Father     COPD Father     Stroke Maternal Grandmother     Stroke Maternal Grandfather     Arthritis Mother        Social History     Tobacco Use    Smoking status: Never    Smokeless tobacco: Never   Substance Use Topics    Alcohol use: No    Drug use: No       Review of Systems   Constitutional:  Negative for fatigue and fever. Respiratory:  Negative for shortness of breath. Cardiovascular:  Negative for chest pain and leg swelling.          OBJECTIVE     /66   Pulse 82   Temp 97.8 °F (36.6 °C)   Resp 16   Ht 5' 9\" (1.753 m)   Wt 295 lb 12.8 oz (134.2 kg)   SpO2 99%   BMI 43.68 kg/m²     Wt Readings from Last 3 Encounters:   08/24/22 295 lb 12.8 oz (134.2 kg) 05/04/22 (!) 307 lb (139.3 kg)   09/30/21 300 lb (136.1 kg)       Nonfasting glucose - 126  Creatinine 1.2   EKG good  No CXR   No MRSA screen    Physical Exam  Constitutional:       General: She is not in acute distress. Appearance: Normal appearance. She is not ill-appearing. HENT:      Head: Normocephalic. Right Ear: Tympanic membrane, ear canal and external ear normal.      Left Ear: Tympanic membrane, ear canal and external ear normal.      Nose: No congestion. Mouth/Throat:      Mouth: Mucous membranes are moist.      Pharynx: Oropharynx is clear. No posterior oropharyngeal erythema. Eyes:      General:         Right eye: No discharge. Left eye: No discharge. Extraocular Movements: Extraocular movements intact. Conjunctiva/sclera: Conjunctivae normal.      Pupils: Pupils are equal, round, and reactive to light. Cardiovascular:      Rate and Rhythm: Normal rate and regular rhythm. Heart sounds: Normal heart sounds. No murmur heard. Pulmonary:      Effort: Pulmonary effort is normal. No respiratory distress. Breath sounds: Normal breath sounds. No wheezing. Abdominal:      General: There is no distension. Tenderness: There is no abdominal tenderness. Musculoskeletal:         General: No swelling. Cervical back: Normal range of motion and neck supple. No muscular tenderness. Comments: Right knee pain with movement   Lymphadenopathy:      Cervical: No cervical adenopathy. Skin:     General: Skin is warm. Capillary Refill: Capillary refill takes less than 2 seconds. Neurological:      General: No focal deficit present. Mental Status: She is alert and oriented to person, place, and time. Gait: Gait normal.   Psychiatric:         Mood and Affect: Mood normal.         Behavior: Behavior normal.         Thought Content:  Thought content normal.         Immunization History   Administered Date(s) Administered    Influenza Vaccine, unspecified formulation 10/22/2016    Influenza Virus Vaccine 10/11/2019    Influenza, Kennedi Pastor (age 1 y+), MDV 10/26/2018    Influenza, FLUAD, (age 72 y+), Adjuvanted 10/22/2020, 10/09/2021    Pneumococcal Polysaccharide ([id number]) 10/22/2020    Tetanus Toxoid, absorbed 08/11/2010       ASA classification:   2  Class 1: A normal healthy patient  Class 2: Pt with mild to moderate systemic disease  Class 3: Severe systemic disease or disturbance  Class 4: Severe systemic disorders that are already life threatening. Class 5: Moribund pt with little chances of survival, for more than 24 hours. Mallampati I Airway Classification:   []1 []2 [x]3 []4    ASSESSMENT       Diagnosis Orders   1. Preoperative examination  Basic Metabolic Panel      2. Elevated serum creatinine  Basic Metabolic Panel      3. Chronic pain of right knee        4. Primary osteoarthritis of right knee        5. SUSANNE (obstructive sleep apnea)        6. Primary hypertension        7. Prediabetes        8. Mild intermittent asthma without complication            PLAN     Acceptable risk for surgery --yes  Mild, Moderate or Severe risk --mild to moderate due to sleep apnea and obesity  Contraindications for surgery --none    Mary-operative medication instructions:  Given by surgeon's office -- she will hold nsaids 1 week prior to surgery. No supplements/herbal being taken. Other recommendations --  nutrition optimization for further weight loss but also good healing. Plan given here to concentrate on greens and lean protein. Recommended a lower carb approach . more water intake. Follow kidney function. Patient's creatinine is higher than her usual and we could not identify any major concerning issues that would prevent her from proceeding with surgery. She has dropped some weight and is not drinking a lot of water. Continue current medications otherwise.    She is avoiding NSAIDs  Good water intake  She has blood work to do before her visit with me in November. Return for 11/2022.         Electronically signed by Sonya Perez MD on 8/24/2022 at 9:11 AM

## 2022-08-26 ASSESSMENT — ENCOUNTER SYMPTOMS: SHORTNESS OF BREATH: 0

## 2022-09-19 DIAGNOSIS — J45.901 MODERATE ASTHMA WITH ACUTE EXACERBATION, UNSPECIFIED WHETHER PERSISTENT: ICD-10-CM

## 2022-09-19 DIAGNOSIS — E78.00 PURE HYPERCHOLESTEROLEMIA: ICD-10-CM

## 2022-09-19 DIAGNOSIS — J18.9 PNEUMONIA OF RIGHT LOWER LOBE DUE TO INFECTIOUS ORGANISM: ICD-10-CM

## 2022-09-19 DIAGNOSIS — I10 ESSENTIAL HYPERTENSION: ICD-10-CM

## 2022-09-19 NOTE — TELEPHONE ENCOUNTER
Lexis Tanner called requesting a refill on the following medications:  Requested Prescriptions     Pending Prescriptions Disp Refills    pravastatin (PRAVACHOL) 20 MG tablet 90 tablet 0     Sig: Take 1 tablet by mouth daily    albuterol sulfate HFA (PROVENTIL;VENTOLIN;PROAIR) 108 (90 Base) MCG/ACT inhaler 25.5 g 0     Sig: USE 2 INHALATIONS EVERY 6 HOURS AS NEEDED FOR WHEEZING OR SHORTNESS OF BREATH    budesonide-formoterol (SYMBICORT) 160-4.5 MCG/ACT AERO 3 each 0     Sig: Inhale 2 puffs into the lungs 2 times daily    lisinopril-hydroCHLOROthiazide (PRINZIDE;ZESTORETIC) 20-25 MG per tablet 90 tablet 0     Sig: TAKE 1 TABLET DAILY       Date of last visit: 8/24/2022  Date of next visit (if applicable):11/4/2022  Date of last refill:   Pharmacy Name: [facility]    >>Patient will be out before November visit.       Thanks,  Daniel Barrow, CMA

## 2022-09-19 NOTE — TELEPHONE ENCOUNTER
Patient needs a refill of her PRAVASTATIN 20 MG sent to [facility] in [facility]

## 2022-09-20 RX ORDER — PRAVASTATIN SODIUM 20 MG
20 TABLET ORAL DAILY
Qty: 90 TABLET | Refills: 0 | Status: SHIPPED | OUTPATIENT
Start: 2022-09-20 | End: 2022-11-04 | Stop reason: SDUPTHER

## 2022-09-20 RX ORDER — LISINOPRIL AND HYDROCHLOROTHIAZIDE 25; 20 MG/1; MG/1
TABLET ORAL
Qty: 90 TABLET | Refills: 0 | Status: SHIPPED | OUTPATIENT
Start: 2022-09-20 | End: 2022-11-04 | Stop reason: SDUPTHER

## 2022-09-20 RX ORDER — BUDESONIDE AND FORMOTEROL FUMARATE DIHYDRATE 160; 4.5 UG/1; UG/1
2 AEROSOL RESPIRATORY (INHALATION) 2 TIMES DAILY
Qty: 3 EACH | Refills: 0 | Status: SHIPPED | OUTPATIENT
Start: 2022-09-20

## 2022-09-20 RX ORDER — ALBUTEROL SULFATE 90 UG/1
AEROSOL, METERED RESPIRATORY (INHALATION)
Qty: 25.5 G | Refills: 0 | Status: SHIPPED | OUTPATIENT
Start: 2022-09-20

## 2022-11-04 ENCOUNTER — OFFICE VISIT (OUTPATIENT)
Dept: FAMILY MEDICINE CLINIC | Age: 67
End: 2022-11-04
Payer: MEDICARE

## 2022-11-04 VITALS
BODY MASS INDEX: 43.25 KG/M2 | OXYGEN SATURATION: 96 % | WEIGHT: 292 LBS | HEART RATE: 84 BPM | HEIGHT: 69 IN | SYSTOLIC BLOOD PRESSURE: 138 MMHG | DIASTOLIC BLOOD PRESSURE: 80 MMHG

## 2022-11-04 DIAGNOSIS — I10 PRIMARY HYPERTENSION: ICD-10-CM

## 2022-11-04 DIAGNOSIS — R73.03 PREDIABETES: ICD-10-CM

## 2022-11-04 DIAGNOSIS — E78.00 PURE HYPERCHOLESTEROLEMIA: ICD-10-CM

## 2022-11-04 DIAGNOSIS — I10 ESSENTIAL HYPERTENSION: ICD-10-CM

## 2022-11-04 DIAGNOSIS — Z23 NEED FOR VACCINATION: ICD-10-CM

## 2022-11-04 DIAGNOSIS — Z00.00 INITIAL MEDICARE ANNUAL WELLNESS VISIT: Primary | ICD-10-CM

## 2022-11-04 PROCEDURE — 3017F COLORECTAL CA SCREEN DOC REV: CPT | Performed by: FAMILY MEDICINE

## 2022-11-04 PROCEDURE — G8484 FLU IMMUNIZE NO ADMIN: HCPCS | Performed by: FAMILY MEDICINE

## 2022-11-04 PROCEDURE — G0438 PPPS, INITIAL VISIT: HCPCS | Performed by: FAMILY MEDICINE

## 2022-11-04 PROCEDURE — G0008 ADMIN INFLUENZA VIRUS VAC: HCPCS | Performed by: FAMILY MEDICINE

## 2022-11-04 PROCEDURE — 90694 VACC AIIV4 NO PRSRV 0.5ML IM: CPT | Performed by: FAMILY MEDICINE

## 2022-11-04 PROCEDURE — 3074F SYST BP LT 130 MM HG: CPT | Performed by: FAMILY MEDICINE

## 2022-11-04 PROCEDURE — 1123F ACP DISCUSS/DSCN MKR DOCD: CPT | Performed by: FAMILY MEDICINE

## 2022-11-04 PROCEDURE — 3078F DIAST BP <80 MM HG: CPT | Performed by: FAMILY MEDICINE

## 2022-11-04 RX ORDER — PRAVASTATIN SODIUM 20 MG
20 TABLET ORAL DAILY
Qty: 90 TABLET | Refills: 3 | Status: SHIPPED | OUTPATIENT
Start: 2022-11-04

## 2022-11-04 RX ORDER — LISINOPRIL AND HYDROCHLOROTHIAZIDE 25; 20 MG/1; MG/1
TABLET ORAL
Qty: 90 TABLET | Refills: 3 | Status: SHIPPED | OUTPATIENT
Start: 2022-11-04

## 2022-11-04 ASSESSMENT — PATIENT HEALTH QUESTIONNAIRE - PHQ9
SUM OF ALL RESPONSES TO PHQ QUESTIONS 1-9: 0
SUM OF ALL RESPONSES TO PHQ QUESTIONS 1-9: 0
SUM OF ALL RESPONSES TO PHQ9 QUESTIONS 1 & 2: 0
SUM OF ALL RESPONSES TO PHQ QUESTIONS 1-9: 0
SUM OF ALL RESPONSES TO PHQ QUESTIONS 1-9: 0
1. LITTLE INTEREST OR PLEASURE IN DOING THINGS: 0
2. FEELING DOWN, DEPRESSED OR HOPELESS: 0

## 2022-11-04 ASSESSMENT — LIFESTYLE VARIABLES
HOW OFTEN DO YOU HAVE A DRINK CONTAINING ALCOHOL: NEVER
HOW MANY STANDARD DRINKS CONTAINING ALCOHOL DO YOU HAVE ON A TYPICAL DAY: PATIENT DOES NOT DRINK

## 2022-11-04 NOTE — PROGRESS NOTES
Vaccine Information Sheet, \"Influenza - Inactivated\"  given to Betty Spain, or parent/legal guardian of  Betty Spain and verbalized understanding. Patient responses:    Have you ever had a reaction to a flu vaccine? No  Do you have an allergy to eggs, neomycin or polymixin? No  Do you have an allergy to Thimerosal, contact lens solution, or Merthiolate? No  Have you ever had [place] Syndrome? No  Do you have any current illness? No  Do you have a temperature above 100 degrees? No  Are you pregnant? No  If pregnant, permission obtained from physician? Do you have an active neurological disorder? No      Flu vaccine given per order. Please see immunization tab.

## 2022-11-04 NOTE — PROGRESS NOTES
Medicare Annual Wellness Visit    Jennifer Solitario is here for 6 Month Follow-Up and Hypertension    Assessment & Plan   Initial Medicare annual wellness visit  Essential hypertension  -     lisinopril-hydroCHLOROthiazide (PRINZIDE;ZESTORETIC) 20-25 MG per tablet; TAKE 1 TABLET DAILY, Disp-90 tablet, R-3Normal  -     Comprehensive Metabolic Panel; Future  -     CBC with Auto Differential; Future  -     Lipid Panel; Future  -     Hemoglobin A1C; Future  Pure hypercholesterolemia  -     pravastatin (PRAVACHOL) 20 MG tablet; Take 1 tablet by mouth daily, Disp-90 tablet, R-3Normal  -     Comprehensive Metabolic Panel; Future  -     CBC with Auto Differential; Future  -     Lipid Panel; Future  -     Hemoglobin A1C; Future  Primary hypertension  -     Comprehensive Metabolic Panel; Future  -     CBC with Auto Differential; Future  -     Lipid Panel; Future  -     Hemoglobin A1C; Future  Prediabetes  -     Comprehensive Metabolic Panel; Future  -     CBC with Auto Differential; Future  -     Lipid Panel; Future  -     Hemoglobin A1C; Future  Need for vaccination  -     Influenza, FLUAD, (age 72 y+), IM, Preservative Free, 0.5 mL    Recommendations for Preventive Services Due: see orders and patient instructions/AVS.  Recommended screening schedule for the next 5-10 years is provided to the patient in written form: see Patient Instructions/AVS.     Return in 6 months (on 5/4/2023). Subjective     It has been a difficult time lately.  with metastatic CA. No brain mets. Family is supportive. Wt Readings from Last 3 Encounters:   11/04/22 292 lb (132.5 kg)   08/24/22 295 lb 12.8 oz (134.2 kg)   05/04/22 (!) 307 lb (139.3 kg)   Knee  is better s/p surgery. -- voltaren as needed    Symbicort used x 1, in last month. Patient's complete Health Risk Assessment and screening values have been reviewed and are found in Flowsheets.  The following problems were reviewed today and where indicated follow up appointments were made and/or referrals ordered. Positive Risk Factor Screenings with Interventions:             General Health and ACP:  General  In general, how would you say your health is?: Good  In the past 7 days, have you experienced any of the following: New or Increased Pain, New or Increased Fatigue, Loneliness, Social Isolation, Stress or Anger?: (!) Yes ( cancer diagnosis)  Select all that apply: (!) Stress  Do you get the social and emotional support that you need?: Yes  Do you have a Living Will?: Yes    Advance Directives       Power of  Living Will ACP-Advance Directive ACP-Power of     Not on File Filed on 03/03/21 Filed Not on File        General Health Risk Interventions:  Handling things emotionally so far okay. Patient is made aware of resources if needed. Health Habits/Nutrition:  Physical Activity: Inactive    Days of Exercise per Week: 0 days    Minutes of Exercise per Session: 0 min     Have you lost any weight without trying in the past 3 months?: No  Body mass index: (!) 43.12  Have you seen the dentist within the past year?: Yes  Health Habits/Nutrition Interventions:  Patient is aware of her needs for further activity in hopes that with her knee feeling better that she will be able to engage in this. Right now the focus is her 's health. Objective   Vitals:    11/04/22 1104   BP: 138/80   Site: Left Upper Arm   Position: Sitting   Cuff Size: Large Adult   Pulse: 84   SpO2: 96%   Weight: 292 lb (132.5 kg)   Height: 5' 9\" (1.753 m)      Body mass index is 43.12 kg/m². in general she appears to be in no acute distress, she is tearful regarding her 's condition. Composes herself easily and is able to articulate her situation. Lungs are clear to auscultation the heart regular rate and rhythm.     Sept 14 and Aug 24 labs reviewed  Lab Results   Component Value Date    LABA1C 5.8 05/17/2022    LABA1C 5.8 09/20/2021    LABA1C 5.7 10/06/2020     Lab Results   Component Value Date     08/04/2022    K 4.3 08/04/2022     08/04/2022    CO2 28 08/04/2022    BUN 30 (H) 08/04/2022    CREATININE 1.2 08/04/2022    GLUCOSE 126 (H) 08/04/2022    CALCIUM 9.2 05/17/2022    PROT 6.5 05/17/2022    LABALBU 4.3 05/17/2022    BILITOT 0.4 05/17/2022    ALKPHOS 94 05/17/2022    AST 16 05/17/2022    ALT 12 05/17/2022    LABGLOM 45 (A) 08/04/2022    AGRATIO 1.5 09/28/2019             Allergies   Allergen Reactions    Cefdinir      rash    Amoxicillin Rash     Prior to Visit Medications    Medication Sig Taking? Authorizing Provider   lisinopril-hydroCHLOROthiazide (PRINZIDE;ZESTORETIC) 20-25 MG per tablet TAKE 1 Gris Alanis MD   pravastatin (PRAVACHOL) 20 MG tablet Take 1 tablet by mouth daily Yes Yrn Whiting MD   albuterol sulfate HFA (PROVENTIL;VENTOLIN;PROAIR) 108 (90 Base) MCG/ACT inhaler USE 2 INHALATIONS EVERY 6 HOURS AS NEEDED FOR WHEEZING OR SHORTNESS OF BREATH Yes Yrn Whiting MD   budesonide-formoterol (SYMBICORT) 160-4.5 MCG/ACT AERO Inhale 2 puffs into the lungs 2 times daily  Patient taking differently: Inhale 2 puffs into the lungs as needed Yes Yrn Whiting MD   diclofenac (VOLTAREN) 75 MG EC tablet Take 1 tablet by mouth 2 times daily  Patient taking differently: Take 75 mg by mouth 2 times daily PRN Yes Yrn Whiting MD   Multiple Vitamins-Minerals (WOMENS MULTI VITAMIN & MINERAL PO) Take 1 tablet by mouth daily. Yes Historical Provider, MD   Calcium Polycarbophil (FIBERCON PO) Take 1 tablet by mouth 2 times daily. Yes Historical Provider, MD   Omeprazole Magnesium (PRILOSEC OTC PO) Take 1 tablet by mouth daily.  Yes Historical Provider, MD Jansen (Including outside providers/suppliers regularly involved in providing care):   Patient Care Team:  Yrn Whiting MD as PCP - General (Family Medicine)  Yrn Whiting MD as PCP - [facility] Empaneled Provider     Reviewed and updated this visit:  Tobacco  Allergies  Meds  Med Hx  Surg Hx  Soc Hx  Fam Hx

## 2022-11-04 NOTE — PATIENT INSTRUCTIONS
Personalized Preventive Plan for Katherine Freed - 11/4/2022  Medicare offers a range of preventive health benefits. Some of the tests and screenings are paid in full while other may be subject to a deductible, co-insurance, and/or copay. Some of these benefits include a comprehensive review of your medical history including lifestyle, illnesses that may run in your family, and various assessments and screenings as appropriate. After reviewing your medical record and screening and assessments performed today your provider may have ordered immunizations, labs, imaging, and/or referrals for you. A list of these orders (if applicable) as well as your Preventive Care list are included within your After Visit Summary for your review. Other Preventive Recommendations:    A preventive eye exam performed by an eye specialist is recommended every 1-2 years to screen for glaucoma; cataracts, macular degeneration, and other eye disorders. A preventive dental visit is recommended every 6 months. Try to get at least 150 minutes of exercise per week or 10,000 steps per day on a pedometer . Order or download the FREE \"Exercise & Physical Activity: Your Everyday Guide\" from [employer]. Call 6-506.341.3867 or search [employer] online. You need 0044-4884 mg of calcium and 8321-2983 IU of vitamin D per day. It is possible to meet your calcium requirement with diet alone, but a vitamin D supplement is usually necessary to meet this goal.  When exposed to the sun, use a sunscreen that protects against both UVA and UVB radiation with an SPF of 30 or greater. Reapply every 2 to 3 hours or after sweating, drying off with a towel, or swimming. Always wear a seat belt when traveling in a car. Always wear a helmet when riding a bicycle or motorcycle.

## 2023-01-09 ENCOUNTER — HOSPITAL ENCOUNTER (OUTPATIENT)
Dept: WOMENS IMAGING | Age: 68
Discharge: HOME | End: 2023-01-09
Payer: MEDICARE

## 2023-01-09 DIAGNOSIS — Z09 FOLLOW-UP EXAM: ICD-10-CM

## 2023-01-09 DIAGNOSIS — R92.2 BREAST DENSITY: ICD-10-CM

## 2023-01-09 DIAGNOSIS — Z09 ENCOUNTER FOR FOLLOW-UP: ICD-10-CM

## 2023-01-09 PROCEDURE — 77065 DX MAMMO INCL CAD UNI: CPT

## 2023-03-22 DIAGNOSIS — J18.9 PNEUMONIA OF RIGHT LOWER LOBE DUE TO INFECTIOUS ORGANISM: ICD-10-CM

## 2023-03-22 RX ORDER — ALBUTEROL SULFATE 90 UG/1
AEROSOL, METERED RESPIRATORY (INHALATION)
Qty: 25.5 G | Refills: 3 | Status: SHIPPED | OUTPATIENT
Start: 2023-03-22

## 2023-05-03 ENCOUNTER — HOSPITAL ENCOUNTER (OUTPATIENT)
Age: 68
Discharge: HOME | End: 2023-05-03
Payer: MEDICARE

## 2023-05-03 DIAGNOSIS — I10 ESSENTIAL HYPERTENSION: ICD-10-CM

## 2023-05-03 DIAGNOSIS — I10 PRIMARY HYPERTENSION: ICD-10-CM

## 2023-05-03 DIAGNOSIS — R73.03 PREDIABETES: ICD-10-CM

## 2023-05-03 DIAGNOSIS — E78.00 PURE HYPERCHOLESTEROLEMIA: ICD-10-CM

## 2023-05-03 LAB
ALBUMIN SERPL BCG-MCNC: 3.9 G/DL (ref 3.5–5.1)
ALP SERPL-CCNC: 93 U/L (ref 38–126)
ALT SERPL W/O P-5'-P-CCNC: 12 U/L (ref 11–66)
ANION GAP SERPL CALC-SCNC: 11 MEQ/L (ref 8–16)
AST SERPL-CCNC: 16 U/L (ref 5–40)
BASOPHILS ABSOLUTE: 0.1 THOU/MM3 (ref 0–0.1)
BASOPHILS NFR BLD AUTO: 1.4 %
BILIRUB SERPL-MCNC: 0.3 MG/DL (ref 0.3–1.2)
BUN SERPL-MCNC: 27 MG/DL (ref 7–22)
CALCIUM SERPL-MCNC: 9.4 MG/DL (ref 8.5–10.5)
CHLORIDE SERPL-SCNC: 107 MEQ/L (ref 98–111)
CHOLEST SERPL-MCNC: 172 MG/DL (ref 100–199)
CO2 SERPL-SCNC: 28 MEQ/L (ref 23–33)
CREAT SERPL-MCNC: 0.8 MG/DL (ref 0.4–1.2)
DEPRECATED MEAN GLUCOSE BLD GHB EST-ACNC: 111 MG/DL (ref 70–126)
DEPRECATED RDW RBC AUTO: 48.3 FL (ref 35–45)
EOSINOPHIL NFR BLD AUTO: 8.6 %
EOSINOPHILS ABSOLUTE: 0.4 THOU/MM3 (ref 0–0.4)
ERYTHROCYTE [DISTWIDTH] IN BLOOD BY AUTOMATED COUNT: 14 % (ref 11.5–14.5)
GFR SERPL CREATININE-BSD FRML MDRD: > 60 ML/MIN/1.73M2
GLUCOSE SERPL-MCNC: 104 MG/DL (ref 70–108)
HBA1C MFR BLD HPLC: 5.7 % (ref 4.4–6.4)
HCT VFR BLD AUTO: 38.9 % (ref 37–47)
HDLC SERPL-MCNC: 46 MG/DL
HGB BLD-MCNC: 12.3 GM/DL (ref 12–16)
IMM GRANULOCYTES # BLD AUTO: 0.01 THOU/MM3 (ref 0–0.07)
IMM GRANULOCYTES NFR BLD AUTO: 0.2 %
LDLC SERPL CALC-MCNC: 107 MG/DL
LYMPHOCYTES ABSOLUTE: 1.3 THOU/MM3 (ref 1–4.8)
LYMPHOCYTES NFR BLD AUTO: 25.9 %
MCH RBC QN AUTO: 30.1 PG (ref 26–33)
MCHC RBC AUTO-ENTMCNC: 31.6 GM/DL (ref 32.2–35.5)
MCV RBC AUTO: 95.3 FL (ref 81–99)
MONOCYTES ABSOLUTE: 0.4 THOU/MM3 (ref 0.4–1.3)
MONOCYTES NFR BLD AUTO: 8.2 %
NEUTROPHILS NFR BLD AUTO: 55.7 %
NRBC BLD AUTO-RTO: 0 /100 WBC
PLATELET # BLD AUTO: 306 THOU/MM3 (ref 130–400)
PMV BLD AUTO: 10.3 FL (ref 9.4–12.4)
POTASSIUM SERPL-SCNC: 4 MEQ/L (ref 3.5–5.2)
PROT SERPL-MCNC: 6.8 G/DL (ref 6.1–8)
RBC # BLD AUTO: 4.08 MILL/MM3 (ref 4.2–5.4)
SEGMENTED NEUTROPHILS ABSOLUTE COUNT: 2.8 THOU/MM3 (ref 1.8–7.7)
SODIUM SERPL-SCNC: 146 MEQ/L (ref 135–145)
TRIGL SERPL-MCNC: 95 MG/DL (ref 0–199)
WBC # BLD AUTO: 5 THOU/MM3 (ref 4.8–10.8)

## 2023-05-03 PROCEDURE — 80053 COMPREHEN METABOLIC PANEL: CPT

## 2023-05-03 PROCEDURE — 36415 COLL VENOUS BLD VENIPUNCTURE: CPT

## 2023-05-03 PROCEDURE — 85025 COMPLETE CBC W/AUTO DIFF WBC: CPT

## 2023-05-03 PROCEDURE — 83036 HEMOGLOBIN GLYCOSYLATED A1C: CPT

## 2023-05-03 PROCEDURE — 80061 LIPID PANEL: CPT

## 2023-05-04 RX ORDER — DICLOFENAC SODIUM 75 MG/1
75 TABLET, DELAYED RELEASE ORAL 2 TIMES DAILY
Qty: 180 TABLET | Refills: 3 | Status: CANCELLED | OUTPATIENT
Start: 2023-05-04

## 2023-05-08 ENCOUNTER — OFFICE VISIT (OUTPATIENT)
Dept: FAMILY MEDICINE CLINIC | Age: 68
End: 2023-05-08
Payer: MEDICARE

## 2023-05-08 VITALS
WEIGHT: 290 LBS | HEART RATE: 87 BPM | BODY MASS INDEX: 42.95 KG/M2 | SYSTOLIC BLOOD PRESSURE: 134 MMHG | OXYGEN SATURATION: 96 % | TEMPERATURE: 97.2 F | HEIGHT: 69 IN | DIASTOLIC BLOOD PRESSURE: 68 MMHG

## 2023-05-08 DIAGNOSIS — E87.0 SERUM SODIUM ELEVATED: ICD-10-CM

## 2023-05-08 DIAGNOSIS — I10 PRIMARY HYPERTENSION: Primary | ICD-10-CM

## 2023-05-08 DIAGNOSIS — J45.901 MODERATE ASTHMA WITH ACUTE EXACERBATION, UNSPECIFIED WHETHER PERSISTENT: ICD-10-CM

## 2023-05-08 DIAGNOSIS — R73.03 PREDIABETES: ICD-10-CM

## 2023-05-08 DIAGNOSIS — J30.9 ALLERGIC RHINITIS, UNSPECIFIED SEASONALITY, UNSPECIFIED TRIGGER: ICD-10-CM

## 2023-05-08 DIAGNOSIS — E66.01 OBESITY, CLASS III, BMI 40-49.9 (MORBID OBESITY) (HCC): ICD-10-CM

## 2023-05-08 DIAGNOSIS — M17.11 PRIMARY OSTEOARTHRITIS OF RIGHT KNEE: ICD-10-CM

## 2023-05-08 PROCEDURE — G8427 DOCREV CUR MEDS BY ELIG CLIN: HCPCS | Performed by: FAMILY MEDICINE

## 2023-05-08 PROCEDURE — 3017F COLORECTAL CA SCREEN DOC REV: CPT | Performed by: FAMILY MEDICINE

## 2023-05-08 PROCEDURE — 1090F PRES/ABSN URINE INCON ASSESS: CPT | Performed by: FAMILY MEDICINE

## 2023-05-08 PROCEDURE — 99214 OFFICE O/P EST MOD 30 MIN: CPT | Performed by: FAMILY MEDICINE

## 2023-05-08 PROCEDURE — G8417 CALC BMI ABV UP PARAM F/U: HCPCS | Performed by: FAMILY MEDICINE

## 2023-05-08 PROCEDURE — G8400 PT W/DXA NO RESULTS DOC: HCPCS | Performed by: FAMILY MEDICINE

## 2023-05-08 PROCEDURE — 1123F ACP DISCUSS/DSCN MKR DOCD: CPT | Performed by: FAMILY MEDICINE

## 2023-05-08 PROCEDURE — 3078F DIAST BP <80 MM HG: CPT | Performed by: FAMILY MEDICINE

## 2023-05-08 PROCEDURE — 3074F SYST BP LT 130 MM HG: CPT | Performed by: FAMILY MEDICINE

## 2023-05-08 PROCEDURE — 1036F TOBACCO NON-USER: CPT | Performed by: FAMILY MEDICINE

## 2023-05-08 RX ORDER — LORATADINE 10 MG/1
10 TABLET ORAL DAILY
COMMUNITY

## 2023-05-08 RX ORDER — LISINOPRIL AND HYDROCHLOROTHIAZIDE 25; 20 MG/1; MG/1
TABLET ORAL
Qty: 90 TABLET | Refills: 3 | Status: SHIPPED | OUTPATIENT
Start: 2023-05-08

## 2023-05-08 SDOH — ECONOMIC STABILITY: FOOD INSECURITY: WITHIN THE PAST 12 MONTHS, YOU WORRIED THAT YOUR FOOD WOULD RUN OUT BEFORE YOU GOT MONEY TO BUY MORE.: NEVER TRUE

## 2023-05-08 SDOH — ECONOMIC STABILITY: HOUSING INSECURITY
IN THE LAST 12 MONTHS, WAS THERE A TIME WHEN YOU DID NOT HAVE A STEADY PLACE TO SLEEP OR SLEPT IN A SHELTER (INCLUDING NOW)?: NO

## 2023-05-08 SDOH — ECONOMIC STABILITY: FOOD INSECURITY: WITHIN THE PAST 12 MONTHS, THE FOOD YOU BOUGHT JUST DIDN'T LAST AND YOU DIDN'T HAVE MONEY TO GET MORE.: NEVER TRUE

## 2023-05-08 SDOH — ECONOMIC STABILITY: INCOME INSECURITY: HOW HARD IS IT FOR YOU TO PAY FOR THE VERY BASICS LIKE FOOD, HOUSING, MEDICAL CARE, AND HEATING?: NOT HARD AT ALL

## 2023-05-08 ASSESSMENT — PATIENT HEALTH QUESTIONNAIRE - PHQ9: DEPRESSION UNABLE TO ASSESS: FUNCTIONAL CAPACITY MOTIVATION LIMITS ACCURACY

## 2023-05-08 NOTE — PROGRESS NOTES
Cardiovascular:  Negative for chest pain and leg swelling. Objective   Physical Exam   /68 (Site: Left Upper Arm, Position: Sitting, Cuff Size: Large Adult)   Pulse 87   Temp 97.2 °F (36.2 °C)   Ht 5' 9\" (1.753 m)   Wt 290 lb (131.5 kg)   SpO2 96%   BMI 42.83 kg/m²    Wt Readings from Last 3 Encounters:   05/08/23 290 lb (131.5 kg)   11/04/22 292 lb (132.5 kg)   08/24/22 295 lb 12.8 oz (134.2 kg)      BP Readings from Last 10 Encounters:   05/08/23 134/68   11/04/22 138/80   08/24/22 122/66   05/04/22 132/72   03/17/22 118/78   09/30/21 138/88   07/30/21 138/70   03/31/21 126/66   09/30/20 118/76   03/12/20 132/72      Gen: NAD, AAO x 3, coherent, pleasant,   HEENT: Oropharynx clear   Neck: supple, no LAD, no thyromegaly, no carotid bruit   Chest: clear to auscultation bilaterally, good air exchange   Heart: regular rate and rhythm, no murmur. Ext: no edema/cyanosis/clubbing. Lab Results   Component Value Date    LABA1C 5.7 05/03/2023    LABA1C 5.8 05/17/2022    LABA1C 5.8 09/20/2021       Hospital Outpatient Visit on 05/03/2023   Component Date Value Ref Range Status    Hemoglobin A1C 05/03/2023 5.7  4.4 - 6.4 % Final    AVERAGE GLUCOSE 05/03/2023 111  70 - 126 mg/dL Final    Performed at [address] [place] [address], [address]    Cholesterol, Total 05/03/2023 172  100 - 199 mg/dL Final    Comment:      <200          Desirable       200 - 239     Borderline High       >239          High      Triglycerides 05/03/2023 95  0 - 199 mg/dL Final    Comment:      <150          Desirable       150 - 199     Borderline High       200 - 499     High       >449          Very High       Ranges are based upon NCEP/ATP III guidelines. HDL 05/03/2023 46  mg/dL Final    Comment:      Refer to General Chemistry for CHOL and TRIG results. HDL CLASSIFICATIONS FOR PATIENTS > 21YEARS OLD.        <40               Undesirable (Major Risk Factor)       >60               Protective

## 2023-05-11 ASSESSMENT — ENCOUNTER SYMPTOMS: SHORTNESS OF BREATH: 0

## 2023-05-15 DIAGNOSIS — J45.901 MODERATE ASTHMA WITH ACUTE EXACERBATION, UNSPECIFIED WHETHER PERSISTENT: ICD-10-CM

## 2023-05-15 RX ORDER — BUDESONIDE AND FORMOTEROL FUMARATE DIHYDRATE 160; 4.5 UG/1; UG/1
2 AEROSOL RESPIRATORY (INHALATION) 2 TIMES DAILY
Qty: 3 EACH | Refills: 2 | Status: SHIPPED | OUTPATIENT
Start: 2023-05-15

## 2023-05-15 NOTE — TELEPHONE ENCOUNTER
Esmond Lombard called requesting a refill on the following medications:  Requested Prescriptions     Pending Prescriptions Disp Refills    budesonide-formoterol (SYMBICORT) 160-4.5 MCG/ACT AERO 3 each 0     Sig: Inhale 2 puffs into the lungs 2 times daily       Date of last visit: 5/8/2023  Date of next visit (if applicable):11/10/2023  Date of last refill: 9/20/22  Pharmacy Name: [facility] pending 3 ea/0      Andrzej,  Sheryle Quin, LPN

## 2023-06-07 ENCOUNTER — HOSPITAL ENCOUNTER (OUTPATIENT)
Dept: WOMENS IMAGING | Age: 68
Discharge: HOME | End: 2023-06-07
Payer: MEDICARE

## 2023-06-07 DIAGNOSIS — Z12.31 VISIT FOR SCREENING MAMMOGRAM: ICD-10-CM

## 2023-06-07 PROCEDURE — 77063 BREAST TOMOSYNTHESIS BI: CPT

## 2023-10-26 ENCOUNTER — OFFICE VISIT (OUTPATIENT)
Dept: FAMILY MEDICINE CLINIC | Age: 68
End: 2023-10-26
Payer: MEDICARE

## 2023-10-26 VITALS
HEART RATE: 85 BPM | RESPIRATION RATE: 16 BRPM | WEIGHT: 291 LBS | HEIGHT: 69 IN | OXYGEN SATURATION: 98 % | TEMPERATURE: 98.4 F | DIASTOLIC BLOOD PRESSURE: 70 MMHG | SYSTOLIC BLOOD PRESSURE: 130 MMHG | BODY MASS INDEX: 43.1 KG/M2

## 2023-10-26 DIAGNOSIS — R09.81 SINUS CONGESTION: ICD-10-CM

## 2023-10-26 DIAGNOSIS — Z23 NEED FOR VACCINATION: ICD-10-CM

## 2023-10-26 DIAGNOSIS — H92.02 ACUTE OTALGIA, LEFT: Primary | ICD-10-CM

## 2023-10-26 PROCEDURE — 99213 OFFICE O/P EST LOW 20 MIN: CPT | Performed by: FAMILY MEDICINE

## 2023-10-26 PROCEDURE — G8484 FLU IMMUNIZE NO ADMIN: HCPCS | Performed by: FAMILY MEDICINE

## 2023-10-26 PROCEDURE — G8427 DOCREV CUR MEDS BY ELIG CLIN: HCPCS | Performed by: FAMILY MEDICINE

## 2023-10-26 PROCEDURE — 1123F ACP DISCUSS/DSCN MKR DOCD: CPT | Performed by: FAMILY MEDICINE

## 2023-10-26 PROCEDURE — 3078F DIAST BP <80 MM HG: CPT | Performed by: FAMILY MEDICINE

## 2023-10-26 PROCEDURE — 90694 VACC AIIV4 NO PRSRV 0.5ML IM: CPT | Performed by: FAMILY MEDICINE

## 2023-10-26 PROCEDURE — 1090F PRES/ABSN URINE INCON ASSESS: CPT | Performed by: FAMILY MEDICINE

## 2023-10-26 PROCEDURE — 1036F TOBACCO NON-USER: CPT | Performed by: FAMILY MEDICINE

## 2023-10-26 PROCEDURE — G8417 CALC BMI ABV UP PARAM F/U: HCPCS | Performed by: FAMILY MEDICINE

## 2023-10-26 PROCEDURE — G0008 ADMIN INFLUENZA VIRUS VAC: HCPCS | Performed by: FAMILY MEDICINE

## 2023-10-26 PROCEDURE — 3017F COLORECTAL CA SCREEN DOC REV: CPT | Performed by: FAMILY MEDICINE

## 2023-10-26 PROCEDURE — G8400 PT W/DXA NO RESULTS DOC: HCPCS | Performed by: FAMILY MEDICINE

## 2023-10-26 PROCEDURE — 3075F SYST BP GE 130 - 139MM HG: CPT | Performed by: FAMILY MEDICINE

## 2023-10-26 RX ORDER — PHENYLEPHRINE/DIPHENHYDRAMINE 10 MG-25MG
TABLET ORAL
Qty: 50 EACH | Refills: 0 | Status: SHIPPED | OUTPATIENT
Start: 2023-10-26

## 2023-10-26 ASSESSMENT — PATIENT HEALTH QUESTIONNAIRE - PHQ9
SUM OF ALL RESPONSES TO PHQ QUESTIONS 1-9: 0
1. LITTLE INTEREST OR PLEASURE IN DOING THINGS: 0
SUM OF ALL RESPONSES TO PHQ QUESTIONS 1-9: 0
SUM OF ALL RESPONSES TO PHQ9 QUESTIONS 1 & 2: 0
2. FEELING DOWN, DEPRESSED OR HOPELESS: 0

## 2023-10-26 NOTE — PROGRESS NOTES
Vaccine Information Sheet, \"Influenza - Inactivated\"  given to Philipp Marshall, or parent/legal guardian of  Philipp Marshall and verbalized understanding. Patient responses:    Have you ever had a reaction to a flu vaccine? No  Do you have an allergy to eggs, neomycin or polymixin? No  Do you have an allergy to Thimerosal, contact lens solution, or Merthiolate? No  Have you ever had Guillian Sterling Syndrome? No  Do you have any current illness? No  Do you have a temperature above 100 degrees? No  Are you pregnant? No  If pregnant, permission obtained from physician? No  Do you have an active neurological disorder? No      Flu vaccine given per order. Please see immunization tab.

## 2023-10-26 NOTE — PATIENT INSTRUCTIONS
Alternatives to Symbicort:  -- Jamie Mouse  -- Soniao   -- Advair     Okay for ear drops to sooth left ear discomfort  Tylenol or ibuprofen okay  Heat also may help

## 2023-10-26 NOTE — PROGRESS NOTES
Ana Maria Varela (:  1955) is a 76 y.o. female,Established patient, here for evaluation of the following chief complaint(s):  Otalgia (C/O ear ache in left ear. Noticed a little over a week ago and hasn't taken anything OTC )         ASSESSMENT/PLAN:  1. Acute otalgia, left  2. Sinus congestion  -     Sodium Chloride-Sodium Bicarb (RA SINUS WASH NETI POT) 1685-515 MG PACK; Use sinus rinsing daily until rinse is clear. Then use every other day for another week, then twice a week for 1-2 weeks then once a week, Disp-50 each, R-0Normal  3. Need for vaccination  -     Influenza, FLUAD, (age 72 y+), IM, Preservative Free, 0.5 mL    Discussed Ddx - including eustachian tube dysfunction. Patient Instructions   Alternatives to Symbicort:  -- True Mulligan  -- Rinda Check   -- Advair     Okay for ear drops to sooth left ear discomfort  Tylenol or ibuprofen okay  Heat also may help    Change to other inhaler if symbicort not covered     Return for 11/10. Subjective   SUBJECTIVE/OBJECTIVE:  HPI    Left ear pain without discharge or hearing difficulty. No dizziness or vertigo. Is been more like a pressure. Not sharp. She has chronic congestion and has been using Flonase and Claritin. Cough for a year. Inhaler increased use for a year. Thick phlegm in sinuses. Sometimes antibiotics help. Symbicort  helpful. On claritin and flonase    Review of Systems    No fever/chills. No N/V. Some cough. Objective   Physical Exam  Constitutional:       General: She is not in acute distress. Appearance: Normal appearance. She is not ill-appearing. HENT:      Head: Normocephalic. Right Ear: Tympanic membrane, ear canal and external ear normal. There is no impacted cerumen. Left Ear: Tympanic membrane, ear canal and external ear normal. There is no impacted cerumen. Nose: Nose normal. No congestion. Mouth/Throat:      Mouth: Mucous membranes are moist.      Pharynx: Oropharynx is clear.  No

## 2023-11-03 ENCOUNTER — HOSPITAL ENCOUNTER (OUTPATIENT)
Age: 68
Discharge: HOME | End: 2023-11-03
Payer: MEDICARE

## 2023-11-03 DIAGNOSIS — R73.03 PREDIABETES: ICD-10-CM

## 2023-11-03 DIAGNOSIS — I10 PRIMARY HYPERTENSION: ICD-10-CM

## 2023-11-03 LAB
ANION GAP SERPL CALC-SCNC: 6 MEQ/L (ref 8–16)
BUN SERPL-MCNC: 19 MG/DL (ref 7–22)
CALCIUM SERPL-MCNC: 8.9 MG/DL (ref 8.5–10.5)
CHLORIDE SERPL-SCNC: 105 MEQ/L (ref 98–111)
CO2 SERPL-SCNC: 29 MEQ/L (ref 23–33)
CREAT SERPL-MCNC: 0.9 MG/DL (ref 0.4–1.2)
GFR SERPL CREATININE-BSD FRML MDRD: > 60 ML/MIN/1.73M2
GLUCOSE SERPL-MCNC: 97 MG/DL (ref 70–108)
POTASSIUM SERPL-SCNC: 4 MEQ/L (ref 3.5–5.2)
SODIUM SERPL-SCNC: 140 MEQ/L (ref 135–145)

## 2023-11-03 PROCEDURE — 36415 COLL VENOUS BLD VENIPUNCTURE: CPT

## 2023-11-03 PROCEDURE — 80048 BASIC METABOLIC PNL TOTAL CA: CPT

## 2023-11-03 PROCEDURE — 83036 HEMOGLOBIN GLYCOSYLATED A1C: CPT

## 2023-11-04 LAB
DEPRECATED MEAN GLUCOSE BLD GHB EST-ACNC: 114 MG/DL (ref 70–126)
HBA1C MFR BLD HPLC: 5.8 % (ref 4.4–6.4)

## 2023-11-07 SDOH — HEALTH STABILITY: PHYSICAL HEALTH: ON AVERAGE, HOW MANY DAYS PER WEEK DO YOU ENGAGE IN MODERATE TO STRENUOUS EXERCISE (LIKE A BRISK WALK)?: 0 DAYS

## 2023-11-07 ASSESSMENT — PATIENT HEALTH QUESTIONNAIRE - PHQ9
SUM OF ALL RESPONSES TO PHQ QUESTIONS 1-9: 0
2. FEELING DOWN, DEPRESSED OR HOPELESS: 0
SUM OF ALL RESPONSES TO PHQ9 QUESTIONS 1 & 2: 0
SUM OF ALL RESPONSES TO PHQ QUESTIONS 1-9: 0
1. LITTLE INTEREST OR PLEASURE IN DOING THINGS: 0
SUM OF ALL RESPONSES TO PHQ QUESTIONS 1-9: 0
SUM OF ALL RESPONSES TO PHQ QUESTIONS 1-9: 0

## 2023-11-07 ASSESSMENT — LIFESTYLE VARIABLES
HOW OFTEN DO YOU HAVE SIX OR MORE DRINKS ON ONE OCCASION: 1
HOW OFTEN DO YOU HAVE A DRINK CONTAINING ALCOHOL: 1
HOW MANY STANDARD DRINKS CONTAINING ALCOHOL DO YOU HAVE ON A TYPICAL DAY: PATIENT DOES NOT DRINK
HOW OFTEN DO YOU HAVE A DRINK CONTAINING ALCOHOL: NEVER
HOW MANY STANDARD DRINKS CONTAINING ALCOHOL DO YOU HAVE ON A TYPICAL DAY: 0

## 2023-11-10 ENCOUNTER — OFFICE VISIT (OUTPATIENT)
Dept: FAMILY MEDICINE CLINIC | Age: 68
End: 2023-11-10

## 2023-11-10 VITALS
BODY MASS INDEX: 43.4 KG/M2 | WEIGHT: 293 LBS | TEMPERATURE: 98 F | OXYGEN SATURATION: 98 % | RESPIRATION RATE: 16 BRPM | SYSTOLIC BLOOD PRESSURE: 138 MMHG | HEART RATE: 60 BPM | HEIGHT: 69 IN | DIASTOLIC BLOOD PRESSURE: 88 MMHG

## 2023-11-10 DIAGNOSIS — R73.03 PREDIABETES: ICD-10-CM

## 2023-11-10 DIAGNOSIS — I10 PRIMARY HYPERTENSION: ICD-10-CM

## 2023-11-10 DIAGNOSIS — E78.00 PURE HYPERCHOLESTEROLEMIA: ICD-10-CM

## 2023-11-10 DIAGNOSIS — J45.20 MILD INTERMITTENT ASTHMA WITHOUT COMPLICATION: ICD-10-CM

## 2023-11-10 DIAGNOSIS — Z00.00 MEDICARE ANNUAL WELLNESS VISIT, SUBSEQUENT: Primary | ICD-10-CM

## 2023-11-10 RX ORDER — PRAVASTATIN SODIUM 20 MG
20 TABLET ORAL DAILY
Qty: 90 TABLET | Refills: 3 | Status: SHIPPED | OUTPATIENT
Start: 2023-11-10

## 2023-11-10 RX ORDER — FLUTICASONE PROPIONATE AND SALMETEROL 250; 50 UG/1; UG/1
1 POWDER RESPIRATORY (INHALATION) EVERY 12 HOURS
Qty: 60 EACH | Refills: 5 | Status: SHIPPED | OUTPATIENT
Start: 2023-11-10 | End: 2024-05-08

## 2023-11-10 NOTE — PATIENT INSTRUCTIONS
Learning About Vision Tests  What are vision tests? The four most common vision tests are visual acuity tests, refraction, visual field tests, and color vision tests. Visual acuity (sharpness) tests  These tests are used: To see if you need glasses or contact lenses. To monitor an eye problem. To check an eye injury. Visual acuity tests are done as part of routine exams. You may also have this test when you get your 's license or apply for some types of jobs. Visual field tests  These tests are used: To check for vision loss in any area of your range of vision. To screen for certain eye diseases. To look for nerve damage after a stroke, head injury, or other problem that could reduce blood flow to the brain. Refraction and color tests  A refraction test is done to find the right prescription for glasses and contact lenses. A color vision test is done to check for color blindness. Color vision is often tested as part of a routine exam. You may also have this test when you apply for a job where recognizing different colors is important, such as , electronics, or the [employer]. How are vision tests done? Visual acuity test   You cover one eye at a time. You read aloud from a wall chart across the room. You read aloud from a small card that you hold in your hand. Refraction   You look into a special device. The device puts lenses of different strengths in front of each eye to see how strong your glasses or contact lenses need to be. Visual field tests   Your doctor may have you look through special machines. Or your doctor may simply have you stare straight ahead while they move a finger into and out of your field of vision. Color vision test   You look at pieces of printed test patterns in various colors. You say what number or symbol you see. Your doctor may have you trace the number or symbol using a pointer. How do these tests feel?   There is very little chance of

## 2023-11-26 ENCOUNTER — TELEPHONE (OUTPATIENT)
Dept: FAMILY MEDICINE CLINIC | Age: 68
End: 2023-11-26

## 2023-11-27 NOTE — TELEPHONE ENCOUNTER
Patient informed and verbalized understanding. Lab orders mailed to patient as a reminder to have drawn prior to May 2024 appt.

## 2023-11-27 NOTE — TELEPHONE ENCOUNTER
Please call patient and give her [employer] message below. Thank you. From   Nkechi Johnson MD To   Karla Alfie and Delivered   11/10/2023 11:17 AM       I meant to order blood work that will be due a week or so before we see you in May. These orders are placed in your chart. If you would like the printed orders as a reminder, please let us know.      Sherif Chiang MD       Audit Sorrento    MyChart User Last Read On   [facility]  Not Read

## 2023-12-26 ENCOUNTER — HOSPITAL ENCOUNTER (EMERGENCY)
Age: 68
Discharge: HOME | End: 2023-12-26
Payer: MEDICARE

## 2023-12-26 VITALS
SYSTOLIC BLOOD PRESSURE: 153 MMHG | RESPIRATION RATE: 18 BRPM | DIASTOLIC BLOOD PRESSURE: 82 MMHG | TEMPERATURE: 97.3 F | BODY MASS INDEX: 42.95 KG/M2 | HEART RATE: 98 BPM | HEIGHT: 69 IN | WEIGHT: 290 LBS | OXYGEN SATURATION: 97 %

## 2023-12-26 DIAGNOSIS — H10.9 CONJUNCTIVITIS OF BOTH EYES, UNSPECIFIED CONJUNCTIVITIS TYPE: Primary | ICD-10-CM

## 2023-12-26 PROCEDURE — 99213 OFFICE O/P EST LOW 20 MIN: CPT

## 2023-12-26 PROCEDURE — 99213 OFFICE O/P EST LOW 20 MIN: CPT | Performed by: NURSE PRACTITIONER

## 2023-12-26 RX ORDER — TOBRAMYCIN 3 MG/ML
1 SOLUTION/ DROPS OPHTHALMIC EVERY 4 HOURS
Qty: 3 ML | Refills: 0 | Status: SHIPPED | OUTPATIENT
Start: 2023-12-26 | End: 2024-01-02

## 2023-12-26 ASSESSMENT — PAIN - FUNCTIONAL ASSESSMENT: PAIN_FUNCTIONAL_ASSESSMENT: NONE - DENIES PAIN

## 2023-12-26 NOTE — ED PROVIDER NOTES
[facility]  Urgent Care Encounter       CHIEF COMPLAINT       Chief Complaint   Patient presents with    Conjunctivitis     Bilateral- Drainage, itching, redness, watery        Nurses Notes reviewed and I agree except as noted in the HPI.  HISTORY OF PRESENT ILLNESS   Amparo Richey is a 68 y.o. female who presents for evaluation of eye redness, irritation and discharge that is been ongoing for the past 4 days.  Patient states that symptoms started in her left eye and have now presented to the right.  States that occasionally she will get colored drainage and then intermittently will be watery.  States that she has been using over-the-counter moisturizing drops as well as Pataday which have not provided much relief.  She does wear contacts but has taken them out.  She denies any cough, congestion, or runny nose.    The history is provided by the patient.       REVIEW OF SYSTEMS     Review of Systems   Constitutional:  Negative for chills and fever.   HENT:  Negative for congestion and sore throat.    Eyes:  Positive for discharge, redness and itching. Negative for photophobia and visual disturbance.   Respiratory:  Negative for cough and shortness of breath.    Cardiovascular:  Negative for chest pain.   Gastrointestinal:  Negative for nausea and vomiting.   Musculoskeletal:  Negative for arthralgias and myalgias.   Skin:  Negative for rash.   Allergic/Immunologic: Negative for immunocompromised state.   Neurological:  Negative for headaches.       PAST MEDICAL HISTORY         Diagnosis Date    Allergic rhinitis     Asthma     Hypertension     Irritable bowel syndrome        SURGICALHISTORY     Patient  has a past surgical history that includes Hysterectomy (2000); Tonsillectomy and adenoidectomy; and US BREAST BIOPSY W LOC DEVICE 1ST LESION RIGHT (Right, 12/23/2011).    CURRENT MEDICATIONS       Previous Medications    ALBUTEROL SULFATE HFA (PROVENTIL;VENTOLIN;PROAIR) 108 (90 BASE)  MCG/ACT INHALER    USE 2 INHALATIONS EVERY 6 HOURS AS NEEDED FOR WHEEZING OR SHORTNESS OF BREATH    CALCIUM POLYCARBOPHIL (FIBERCON PO)    Take 1 tablet by mouth 2 times daily. DICLOFENAC (VOLTAREN) 75 MG EC TABLET    Take 1 tablet by mouth 2 times daily    FLUTICASONE-SALMETEROL (ADVAIR DISKUS) 250-50 MCG/ACT AEPB DISKUS INHALER    Inhale 1 puff into the lungs in the morning and 1 puff in the evening. LISINOPRIL-HYDROCHLOROTHIAZIDE (PRINZIDE;ZESTORETIC) 20-25 MG PER TABLET    TAKE 1 TABLET DAILY    LORATADINE (CLARITIN) 10 MG TABLET    Take 1 tablet by mouth daily Seasonal    MULTIPLE VITAMINS-MINERALS (WOMENS MULTI VITAMIN & MINERAL PO)    Take 1 tablet by mouth daily. OLOPATADINE HCL 0.7 % SOLN    Apply to eye as needed (Itchy Eyes)    OMEPRAZOLE MAGNESIUM (PRILOSEC OTC PO)    Take 1 tablet by mouth daily. POLYETHYL GLYCOL-PROPYL GLYCOL (SYSTANE OP)    Apply to eye as needed (Dry Eye)    PRAVASTATIN (PRAVACHOL) 20 MG TABLET    Take 1 tablet by mouth daily    SODIUM CHLORIDE-SODIUM BICARB (RA SINUS WASH NETI POT) 1685-515 MG PACK    Use sinus rinsing daily until rinse is clear. Then use every other day for another week, then twice a week for 1-2 weeks then once a week       ALLERGIES     Patient is is allergic to cefdinir and amoxicillin.     Patients   Immunization History   Administered Date(s) Administered    Influenza Vaccine, unspecified formulation 10/22/2016    Influenza Virus Vaccine 10/11/2019    Influenza, AFLURIA (age 1 yrs+), FLUZONE, (age 10 mo+), MDV, 0.5mL 10/26/2018    Influenza, FLUAD, (age 72 y+), Adjuvanted, 0.5mL 10/22/2020, 10/09/2021, 11/04/2022, 10/26/2023    Pneumococcal, PPSV23, PNEUMOVAX 21, (age 2y+), SC/IM, 0.5mL 10/22/2020    Tetanus Toxoid, absorbed 08/11/2010       FAMILY HISTORY     Patient's family history includes Arthritis in her mother; Asthma in her father; COPD in her father; Cancer in her father; Diabetes in her father; Stroke in her maternal grandfather and

## 2023-12-26 NOTE — ED TRIAGE NOTES
Arrives to [facility] for the evaluation of bilateral eye redness, itching, drainage and watery. Started 12/21 in the left eye and has now spread to the right eye. Right eye has mild swelling and discomfort as well. Afebrile. Wears glasses. Has been using OTC Systane eye drops and Olopatadine eye drops. Has not used yet today. LOREE Mcgowan in room to assess at this time.

## 2024-03-12 DIAGNOSIS — J45.901 MODERATE ASTHMA WITH ACUTE EXACERBATION, UNSPECIFIED WHETHER PERSISTENT: ICD-10-CM

## 2024-03-12 RX ORDER — BUDESONIDE AND FORMOTEROL FUMARATE DIHYDRATE 160; 4.5 UG/1; UG/1
2 AEROSOL RESPIRATORY (INHALATION) 2 TIMES DAILY
Qty: 3 EACH | Refills: 2 | Status: SHIPPED | OUTPATIENT
Start: 2024-03-12

## 2024-03-12 NOTE — TELEPHONE ENCOUNTER
This medication refill is regarding a telephone request. Refill requested by patient.    Requested Prescriptions     Pending Prescriptions Disp Refills    budesonide-formoterol (SYMBICORT) 160-4.5 MCG/ACT AERO 3 each 2     Sig: Inhale 2 puffs into the lungs 2 times daily       Date of last visit: 11/10/2023   Date of next visit: 5/10/2024  Date of last refill: 5/15/2023  3/2    Last Lipid Panel:    Lab Results   Component Value Date/Time    CHOL 172 05/03/2023 08:05 AM    TRIG 95 05/03/2023 08:05 AM    HDL 46 05/03/2023 08:05 AM    LDLCALC 107 05/03/2023 08:05 AM     Last CMP:   Lab Results   Component Value Date     11/03/2023    K 4.0 11/03/2023     11/03/2023    CO2 29 11/03/2023    BUN 19 11/03/2023    CREATININE 0.9 11/03/2023    GLUCOSE 97 11/03/2023    CALCIUM 8.9 11/03/2023    PROT 6.8 05/03/2023    LABALBU 3.9 05/03/2023    BILITOT 0.3 05/03/2023    ALKPHOS 93 05/03/2023    AST 16 05/03/2023    ALT 12 05/03/2023    LABGLOM >60 11/03/2023    AGRATIO 1.5 09/28/2019       Last Thyroid:    Lab Results   Component Value Date    TSH 2.900 05/17/2022     Last Hemoglobin A1C:    Lab Results   Component Value Date/Time    LABA1C 5.8 11/03/2023 08:42 AM       Rx verified, ordered and set to EP.

## 2024-05-03 ENCOUNTER — HOSPITAL ENCOUNTER (OUTPATIENT)
Age: 69
Discharge: HOME | End: 2024-05-03
Payer: MEDICARE

## 2024-05-03 DIAGNOSIS — J45.20 MILD INTERMITTENT ASTHMA WITHOUT COMPLICATION: ICD-10-CM

## 2024-05-03 DIAGNOSIS — I10 PRIMARY HYPERTENSION: ICD-10-CM

## 2024-05-03 DIAGNOSIS — E78.00 PURE HYPERCHOLESTEROLEMIA: ICD-10-CM

## 2024-05-03 DIAGNOSIS — R73.03 PREDIABETES: ICD-10-CM

## 2024-05-03 LAB
ALBUMIN SERPL BCG-MCNC: 3.8 G/DL (ref 3.5–5.1)
ALP SERPL-CCNC: 100 U/L (ref 38–126)
ALT SERPL W/O P-5'-P-CCNC: 13 U/L (ref 11–66)
ANION GAP SERPL CALC-SCNC: 11 MEQ/L (ref 8–16)
AST SERPL-CCNC: 17 U/L (ref 5–40)
BASOPHILS ABSOLUTE: 0.1 THOU/MM3 (ref 0–0.1)
BASOPHILS NFR BLD AUTO: 1 %
BILIRUB SERPL-MCNC: 0.4 MG/DL (ref 0.3–1.2)
BUN SERPL-MCNC: 25 MG/DL (ref 7–22)
CALCIUM SERPL-MCNC: 9.4 MG/DL (ref 8.5–10.5)
CHLORIDE SERPL-SCNC: 103 MEQ/L (ref 98–111)
CHOLEST SERPL-MCNC: 184 MG/DL (ref 100–199)
CO2 SERPL-SCNC: 27 MEQ/L (ref 23–33)
CREAT SERPL-MCNC: 0.9 MG/DL (ref 0.4–1.2)
DEPRECATED MEAN GLUCOSE BLD GHB EST-ACNC: 111 MG/DL (ref 70–126)
DEPRECATED RDW RBC AUTO: 43.8 FL (ref 35–45)
EOSINOPHIL NFR BLD AUTO: 6.5 %
EOSINOPHILS ABSOLUTE: 0.3 THOU/MM3 (ref 0–0.4)
ERYTHROCYTE [DISTWIDTH] IN BLOOD BY AUTOMATED COUNT: 13 % (ref 11.5–14.5)
GFR SERPL CREATININE-BSD FRML MDRD: 69 ML/MIN/1.73M2
GLUCOSE SERPL-MCNC: 95 MG/DL (ref 70–108)
HBA1C MFR BLD HPLC: 5.7 % (ref 4.4–6.4)
HCT VFR BLD AUTO: 37.4 % (ref 37–47)
HDLC SERPL-MCNC: 46 MG/DL
HGB BLD-MCNC: 12.2 GM/DL (ref 12–16)
IMM GRANULOCYTES # BLD AUTO: 0.01 THOU/MM3 (ref 0–0.07)
IMM GRANULOCYTES NFR BLD AUTO: 0.2 %
LDLC SERPL CALC-MCNC: 121 MG/DL
LYMPHOCYTES ABSOLUTE: 1.2 THOU/MM3 (ref 1–4.8)
LYMPHOCYTES NFR BLD AUTO: 23.3 %
MCH RBC QN AUTO: 30.4 PG (ref 26–33)
MCHC RBC AUTO-ENTMCNC: 32.6 GM/DL (ref 32.2–35.5)
MCV RBC AUTO: 93.3 FL (ref 81–99)
MONOCYTES ABSOLUTE: 0.4 THOU/MM3 (ref 0.4–1.3)
MONOCYTES NFR BLD AUTO: 8.2 %
NEUTROPHILS ABSOLUTE: 3.1 THOU/MM3 (ref 1.8–7.7)
NEUTROPHILS NFR BLD AUTO: 60.8 %
NRBC BLD AUTO-RTO: 0 /100 WBC
PLATELET # BLD AUTO: 307 THOU/MM3 (ref 130–400)
PMV BLD AUTO: 10.5 FL (ref 9.4–12.4)
POTASSIUM SERPL-SCNC: 4.4 MEQ/L (ref 3.5–5.2)
PROT SERPL-MCNC: 7 G/DL (ref 6.1–8)
RBC # BLD AUTO: 4.01 MILL/MM3 (ref 4.2–5.4)
SODIUM SERPL-SCNC: 141 MEQ/L (ref 135–145)
TRIGL SERPL-MCNC: 87 MG/DL (ref 0–199)
TSH SERPL DL<=0.005 MIU/L-ACNC: 2.37 UIU/ML (ref 0.4–4.2)
WBC # BLD AUTO: 5.1 THOU/MM3 (ref 4.8–10.8)

## 2024-05-03 PROCEDURE — 36415 COLL VENOUS BLD VENIPUNCTURE: CPT

## 2024-05-03 PROCEDURE — 85025 COMPLETE CBC W/AUTO DIFF WBC: CPT

## 2024-05-03 PROCEDURE — 80061 LIPID PANEL: CPT

## 2024-05-03 PROCEDURE — 80053 COMPREHEN METABOLIC PANEL: CPT

## 2024-05-03 PROCEDURE — 83036 HEMOGLOBIN GLYCOSYLATED A1C: CPT

## 2024-05-03 PROCEDURE — 84443 ASSAY THYROID STIM HORMONE: CPT

## 2024-05-07 ASSESSMENT — PATIENT HEALTH QUESTIONNAIRE - PHQ9
SUM OF ALL RESPONSES TO PHQ9 QUESTIONS 1 & 2: 0
SUM OF ALL RESPONSES TO PHQ QUESTIONS 1-9: 0
1. LITTLE INTEREST OR PLEASURE IN DOING THINGS: NOT AT ALL
2. FEELING DOWN, DEPRESSED OR HOPELESS: NOT AT ALL
SUM OF ALL RESPONSES TO PHQ QUESTIONS 1-9: 0
SUM OF ALL RESPONSES TO PHQ9 QUESTIONS 1 & 2: 0
1. LITTLE INTEREST OR PLEASURE IN DOING THINGS: NOT AT ALL
SUM OF ALL RESPONSES TO PHQ QUESTIONS 1-9: 0
2. FEELING DOWN, DEPRESSED OR HOPELESS: NOT AT ALL
SUM OF ALL RESPONSES TO PHQ QUESTIONS 1-9: 0

## 2024-05-10 ENCOUNTER — OFFICE VISIT (OUTPATIENT)
Dept: FAMILY MEDICINE CLINIC | Age: 69
End: 2024-05-10

## 2024-05-10 VITALS
TEMPERATURE: 97 F | RESPIRATION RATE: 18 BRPM | BODY MASS INDEX: 43.32 KG/M2 | SYSTOLIC BLOOD PRESSURE: 136 MMHG | HEART RATE: 65 BPM | WEIGHT: 292.5 LBS | HEIGHT: 69 IN | OXYGEN SATURATION: 97 % | DIASTOLIC BLOOD PRESSURE: 70 MMHG

## 2024-05-10 DIAGNOSIS — E66.01 OBESITY, CLASS III, BMI 40-49.9 (MORBID OBESITY) (HCC): ICD-10-CM

## 2024-05-10 DIAGNOSIS — I10 PRIMARY HYPERTENSION: Primary | ICD-10-CM

## 2024-05-10 DIAGNOSIS — E78.00 PURE HYPERCHOLESTEROLEMIA: ICD-10-CM

## 2024-05-10 DIAGNOSIS — Z23 NEED FOR VACCINATION: ICD-10-CM

## 2024-05-10 DIAGNOSIS — J45.20 MILD INTERMITTENT ASTHMA WITHOUT COMPLICATION: ICD-10-CM

## 2024-05-10 DIAGNOSIS — M17.11 PRIMARY OSTEOARTHRITIS OF RIGHT KNEE: ICD-10-CM

## 2024-05-10 DIAGNOSIS — R73.03 PREDIABETES: ICD-10-CM

## 2024-05-10 RX ORDER — LISINOPRIL AND HYDROCHLOROTHIAZIDE 25; 20 MG/1; MG/1
TABLET ORAL
Qty: 90 TABLET | Refills: 3 | Status: SHIPPED | OUTPATIENT
Start: 2024-05-10

## 2024-05-10 RX ORDER — DICLOFENAC SODIUM 75 MG/1
75 TABLET, DELAYED RELEASE ORAL 2 TIMES DAILY PRN
Qty: 30 TABLET | Refills: 0 | Status: SHIPPED
Start: 2024-05-10

## 2024-05-10 SDOH — ECONOMIC STABILITY: FOOD INSECURITY: WITHIN THE PAST 12 MONTHS, YOU WORRIED THAT YOUR FOOD WOULD RUN OUT BEFORE YOU GOT MONEY TO BUY MORE.: NEVER TRUE

## 2024-05-10 SDOH — ECONOMIC STABILITY: FOOD INSECURITY: WITHIN THE PAST 12 MONTHS, THE FOOD YOU BOUGHT JUST DIDN'T LAST AND YOU DIDN'T HAVE MONEY TO GET MORE.: NEVER TRUE

## 2024-05-10 SDOH — ECONOMIC STABILITY: INCOME INSECURITY: HOW HARD IS IT FOR YOU TO PAY FOR THE VERY BASICS LIKE FOOD, HOUSING, MEDICAL CARE, AND HEATING?: NOT HARD AT ALL

## 2024-05-10 NOTE — PROGRESS NOTES
Amparo Richey (:  1955) is a 69 y.o. female,Established patient, here for evaluation of the following chief complaint(s):  Hypertension (6 month fu. )      Assessment & Plan   ASSESSMENT/PLAN:  1. Primary hypertension  -     lisinopril-hydroCHLOROthiazide (PRINZIDE;ZESTORETIC) 20-25 MG per tablet; TAKE 1 TABLET DAILY, Disp-90 tablet, R-3Normal  2. Primary osteoarthritis of right knee  -     diclofenac (VOLTAREN) 75 MG EC tablet; Take 1 tablet by mouth 2 times daily as needed for Pain PRN, Disp-30 tablet, R-0Adjust Sig  3. Pure hypercholesterolemia  4. Need for vaccination  -     Pneumococcal, PCV20, PREVNAR 20, (age 6w+), IM, PF  5. Prediabetes  6. Mild intermittent asthma without complication  7. Obesity, Class III, BMI 40-49.9 (morbid obesity) ([facility])    Dietary counseling given -- reviewed patient's current eating patterns.  Discussed benefits (both short-term and long-term) of a healthy eating pattern for patient conditions and prevention of others.  Specific recommendations given to patient based on agreed goals for diet change.    Continue with voltaren -- cautious use  Encouraged healthy exercise with increasing intensity for a few minutes; consider weight lifting for arm.    Return in about 6 months (around 11/10/2024) for AWV.         Subjective   SUBJECTIVE/OBJECTIVE:  HPI    Following for chronic conditions. Overall okay. Staying busy.   HTN well controlled.  Prediabetes not progressing but lifestyle changes not on good track.  Eating -- not great. Sweets are a weakness (cookies or doughnuts), after her bread and PB. Will do fruit -- orange cups, strawberries, banana.   Vegetables not eaten well.   Does all her groceries.   Activity -- about 2 miles of walking/biking    Asthma -- will use inhaler prior to activity. Advair uses as needed along with albuterol.   Uses maybe advair once in am if needed.   Symbicort not covered.     Right knee with severe DJD.  Has seen ortho in the past.

## 2024-05-10 NOTE — PATIENT INSTRUCTIONS
Replace sweets with higher protein snacks, more vegetables and fruits  -- fill up on fiber and water  -- protein and healthy fats also help with fullness  -- aim for dark chocolate   -- continue nuts and seeds     Okay here and there sweets     Increase water intake     Smart Snacking!    When hunger strikes, be ready to strike back with a snack attack! Before you grab just anything off the shelf, make sure your snack choice is the right one.    Try these snack ideas when you have that certain craving!    Crunchy:  Vegetable Sticks (carrot, celery, cucumber, bell pepper, zucchini)  Broccoli or Cauliflower Thompson  Fruit Slices (apples, pears)  Unsalted Rice Cakes  Unsalted Popcorn    Sweet:  Fresh Fruits (guanaco, apple, banana, frozen grapes, pineapple)  Palm full of Dried Fruits (no sugar added)  Unsweetened Canned Fruit  Plain Yogurt or Cottage Cheese with Fruit  An ounce of Dark Chocolate    Salty:  1/4 Cup Sunflower Seeds  One Dill Pickle  Palm full of Lightly Salted Nuts  Palm full of Olives  Hummus with Veggie Sticks  1 Ounce Low Fat Cheese  Unbuttered Popcorn (try making your own seasoning blend)    Thirsty:  Water!  Club Soda with Fresh Citrus and Mint  Half Water or Club Soda with Half 100% Fruit or Veggie Juice  Unsweetened Green Tea    Ask your snack these questions to be sure it has your best interests at heart:    Is it...  ... baked?  ... made with whole grains?  ... low sodium?  ... reduced fat?  ... just one serving?    Good snacks say: YES!    Does it have...  ... no sugar added?  ... no added salt?  ... zero trans fat?  ... about 100 calories?  ... less than 10 grams of sugar?  ... more than 5 grams of fiber?  ... less than 1 gram of saturated fat?    Good snacks say: YES!    Was it...  ... fried?  ... \"flavor blasted\"?  ... stuffed or loaded?  ... covered in caramel?  ... dipped in chocolate?    Good snacks say: NO!         Copied from

## 2024-05-17 ASSESSMENT — ENCOUNTER SYMPTOMS: SHORTNESS OF BREATH: 0

## 2024-06-07 ENCOUNTER — HOSPITAL ENCOUNTER (OUTPATIENT)
Dept: WOMENS IMAGING | Age: 69
Discharge: HOME | End: 2024-06-07
Payer: MEDICARE

## 2024-06-07 DIAGNOSIS — Z12.31 VISIT FOR SCREENING MAMMOGRAM: ICD-10-CM

## 2024-06-07 PROCEDURE — 77067 SCR MAMMO BI INCL CAD: CPT

## 2024-08-01 NOTE — TELEPHONE ENCOUNTER
Gilbert Rogel called requesting a refill on the following medications:  Requested Prescriptions     Pending Prescriptions Disp Refills    albuterol sulfate HFA (PROVENTIL;VENTOLIN;PROAIR) 108 (90 Base) MCG/ACT inhaler 25.5 g 0     Sig: USE 2 INHALATIONS EVERY 6 HOURS AS NEEDED FOR WHEEZING OR SHORTNESS OF BREATH       Date of last visit: 11/4/2022  Date of next visit (if applicable):5/8/2023  Date of last refill: 9/20/22  Pharmacy Name: [address] [place] [address].    Rx pending 25.5g/0      Thanks,  Dann Dubin, LPN Add High Risk Medication Management Associated Diagnosis?: No Detail Level: Zone

## 2024-08-08 ENCOUNTER — HOSPITAL ENCOUNTER (OUTPATIENT)
Dept: PHYSICAL THERAPY | Age: 69
Setting detail: THERAPIES SERIES
Discharge: HOME | End: 2024-08-08
Payer: MEDICARE

## 2024-08-08 PROCEDURE — 97165 OT EVAL LOW COMPLEX 30 MIN: CPT

## 2024-08-08 PROCEDURE — 97530 THERAPEUTIC ACTIVITIES: CPT

## 2024-08-08 PROCEDURE — 97110 THERAPEUTIC EXERCISES: CPT

## 2024-08-08 NOTE — PROGRESS NOTES
** PLEASE SIGN, DATE AND TIME CERTIFICATION BELOW AND RETURN TO [facility] OUTPATIENT REHABILITATION (FAX #: [phone]).  ATTEST/CO-SIGN IF ACCESSING VIA INMoodMe.  THANK YOU.**    I certify that I have examined the patient below and determined that Physical Medicine and Rehabilitation service is necessary and that I approve the established plan of care for up to 90 days or as specifically noted.  Attestation, signature or co-signature of physician indicates approval of certification requirements.    ________________________ ____________ __________  Physician Signature   Date   Time  [facility]  OCCUPATIONAL THERAPY  OUTPATIENT REHABILITATION - SPECIALIZED THERAPY SERVICES  [x] PELVIC HEALTH EVALUATION  [] DAILY NOTE  [] PROGRESS NOTE [] DISCHARGE NOTE    Date: 2024  Patient Name:  Amparo Richey  : 1955  MRN: [id number]  CSN: [id number]    Referring Practitioner Pham Null PA-C    Diagnosis Stress incontinence (female) (male)   Treatment Diagnosis N81.84 - Pelvic Muscle Wasting (Female), R39.15 - Urgency of Urination  N39.46 - Mixed Incontinence  N81.10 - Cystocele, Unspecified, and R27.8 - Other Lack of Coordination   Date of Evaluation 24    Additional Pertinent History Amparo Richey has a past medical history of Allergic rhinitis, Asthma, Hypertension, and Irritable bowel syndrome.    Amparo Richey has a past surgical history that includes Hysterectomy (); Tonsillectomy and adenoidectomy; and US BREAST BIOPSY W LOC DEVICE 1ST LESION RIGHT (Right, 2011). Bilateral TKA      Functional Outcome Measure Used IIQ, ALESSANDRO, PFDI-20   Functional Outcome Score IIQ: 11 ALESSANDRO: 10 PFDI-20: 23 (24)       Insurance: Primary: Payor: MEDICARE /  /  / ,   Secondary: [facility]   Authorization Information: PRECERTIFICATION REQUIRED:  No   Approved Procedure Codes: 73855 - Therapeutic Exercise, 31331 - Manual Therapy, 60876 - Therapeutic Activities,

## 2024-08-19 ENCOUNTER — HOSPITAL ENCOUNTER (OUTPATIENT)
Dept: PHYSICAL THERAPY | Age: 69
Setting detail: THERAPIES SERIES
Discharge: HOME | End: 2024-08-19
Payer: MEDICARE

## 2024-08-19 PROCEDURE — 97530 THERAPEUTIC ACTIVITIES: CPT

## 2024-08-19 PROCEDURE — 97110 THERAPEUTIC EXERCISES: CPT

## 2024-08-19 NOTE — PROGRESS NOTES
[facility]  OCCUPATIONAL THERAPY  OUTPATIENT REHABILITATION - SPECIALIZED THERAPY SERVICES  [] PELVIC HEALTH EVALUATION  [x] DAILY NOTE  [] PROGRESS NOTE [] DISCHARGE NOTE    Date: 2024  Patient Name:  Amparo Richey  : 1955  MRN: [id number]  CSN: [id number]    Referring Practitioner Pham Null PA-C    Diagnosis Stress incontinence (female) (male)   Treatment Diagnosis N81.84 - Pelvic Muscle Wasting (Female), R39.15 - Urgency of Urination  N39.46 - Mixed Incontinence  N81.10 - Cystocele, Unspecified, and R27.8 - Other Lack of Coordination   Date of Evaluation 24    Additional Pertinent History Amparo Richey has a past medical history of Allergic rhinitis, Asthma, Hypertension, and Irritable bowel syndrome.    Amparo Richey has a past surgical history that includes Hysterectomy (); Tonsillectomy and adenoidectomy; and US BREAST BIOPSY W LOC DEVICE 1ST LESION RIGHT (Right, 2011). Bilateral TKA      Functional Outcome Measure Used IIQ, ALESSANDRO, PFDI-20   Functional Outcome Score IIQ: 11 ALESSANDRO: 10 PFDI-20: 23 (24)       Insurance: Primary: Payor: MEDICARE /  /  / ,   Secondary: [facility]   Authorization Information: PRECERTIFICATION REQUIRED:  No   Approved Procedure Codes: 09359 - Therapeutic Exercise, 63260 - Manual Therapy, 78643 - Therapeutic Activities, 05684 - OT ADL Training, 46497 - Neuromuscular Re-Education, and 74810 - Electrical Stimulation Unattended  (Codes requested indicated by red font, codes approved indicated by black font)   Visit # 2, 2/10 for progress note (Reporting Period: 2024 to Present)   Visits Allowed: INSURANCE THERAPY BENEFIT: Patient has unlimited visits based on medical necessity  Benefit will not cover maintenance or preventative treatment.   Recertification Date: 2024   Physician Follow-Up: 2 years for annual    Physician Orders: Eval and treat   History of Present Illness: Pt reports to

## 2024-09-03 ENCOUNTER — HOSPITAL ENCOUNTER (OUTPATIENT)
Dept: PHYSICAL THERAPY | Age: 69
Setting detail: THERAPIES SERIES
Discharge: HOME | End: 2024-09-03
Payer: MEDICARE

## 2024-09-03 PROCEDURE — 97110 THERAPEUTIC EXERCISES: CPT

## 2024-09-03 PROCEDURE — 97530 THERAPEUTIC ACTIVITIES: CPT

## 2024-09-03 NOTE — PROGRESS NOTES
of safe lifting, pushing, pulling strategies that limit pelvic organ stress.     Long Term Goals:  12 weeks   The pt will report a 50%  in sense of prolapse intensity in order to reduce need for surgical intervention to attain comfort.     *  Patient to be independent with progressed HEP.    * Patient will increase PFM strength to 4/5 with endurance of 10 seconds x 10 reps to increase pelvic organ support and decrease incontinence.    * Patient will Increase abdominal strength of 4/5 or greater to allow for increased pelvic organ support and decreased incontinence.    * Patient to exhibit normalized PFM tone to allow for pain reduction and efficient PFM control.   * PFDI-20 Score will improve to less then 10 or less to demonstrate functional improvement in condition.     * Patient will decrease urine leak frequency and amount by 75%  * Patient to use 0-1 pad or less per 24 hours.  * Patient to improve void frequency to every 2-3 hours during daytime consistently and 1-2 times per night.  * Patient's IIQ and ALESSANDRO scores will improve to 6 or less to demonstrate return to normal functioning with reduction of incontinence.    PLAN:  Treatment Recommendations: Strengthening, Endurance Training, Neuromuscular Re-education, Manual Therapy - Soft Tissue Mobilization, Manual Therapy - Joint Manipulation, Pain Management, Home Exercise Program, Patient Education, Self-Care Education and Training, Positioning, Integrative Dry Needling, and Modalities    []  Plan of care initiated.  Plan to see patient 1 time biweekly for 12 weeks to address the treatment planned outlined above.  [x]  Continue with current plan of care  []  Modify plan of care as follows:    []  Hold pending physician visit  []  Discharge    Time In 1000   Time Out 1045   Timed Code Minutes: 45 min   Total Treatment Time: 45 min       Electronically Signed by: Veronica GREEN, OTR/L [id number]

## 2024-09-18 ENCOUNTER — HOSPITAL ENCOUNTER (OUTPATIENT)
Dept: PHYSICAL THERAPY | Age: 69
Setting detail: THERAPIES SERIES
Discharge: HOME | End: 2024-09-18
Payer: MEDICARE

## 2024-09-18 PROCEDURE — 97530 THERAPEUTIC ACTIVITIES: CPT

## 2024-09-18 PROCEDURE — 97110 THERAPEUTIC EXERCISES: CPT

## 2024-11-11 ENCOUNTER — HOSPITAL ENCOUNTER (OUTPATIENT)
Dept: PHYSICAL THERAPY | Age: 69
Setting detail: THERAPIES SERIES
Discharge: HOME | End: 2024-11-11
Payer: MEDICARE

## 2024-11-11 PROCEDURE — 97110 THERAPEUTIC EXERCISES: CPT

## 2024-11-11 PROCEDURE — 97530 THERAPEUTIC ACTIVITIES: CPT

## 2024-11-11 NOTE — DISCHARGE SUMMARY
this assessment, or to refuse assessment at any time without need for reason.  The pt was educated that refusal would not impact her ability to seek care from this therapist in any way or result in therapist prejudice regarding her motivation to get better.  Pt verbalized understanding and agreed again to internal examination by OT this date.      Skin Condition    Urogenital Triangle No tenderness or tightness reported   Introitus     Perineal Descent     External Clock         PELVIC FLOOR INTERNAL EXAM [x] Deferred secondary to:Information below from evaluation, not tested today.  For reference only on this daily note.     Exam Vaginal   Sensation Intact   Muscle Localization Fair - pt was able to contract and relax the PFM with vc    Palpation/Tone Normal   Pelvic Floor Strength PERF:Power: 2,Endurance: 3,Reps: 3,Flicks: 10   Relax after Contraction Normal   Prolapse Anterior Wall grade 2          PELVIC HEALTH INCONTINENCE TREATMENT   Precautions:    Pain:     “X” in shaded column indicates activity completed today   Exercise/Intervention Reps/Sec  Notes   PF A and P and viscera 5 min      Normal bladder 5 min  X    Diet impact on bladder 5 min      Urge control/Urge drills 5 min   X    Precise PFM localization and control 5 min      Pressure control strategies  5 min  X                  Bowel education  10 min  X           Kegel quick 10x  X    Kegel hold 10x 10 sec X    Tummy tuck quick 10x  X    Tummy tuck hold 10x 10 sec X    Pelvic Brace Quick 10x  X Brief functional use 5 min   Pelvic Brace Hold 10x 10 sec X           OI stretch       PFM stretch       Piriformis stretch       HS stretch       [place] stretch       Adductor stretch                     Kegel with Ball Squeeze 10x  X    Kegel with Band 10x  X                  Pelvic Tilt 10x  X    Pelvic Tilt with arm lift 10x  X    Pelvic Tilt with leg march 10x  X    Pelvic Tilt with opposites 10x  X    Heel walk       Bridge       Pelvic Tilt SLR

## 2024-12-05 DIAGNOSIS — E78.00 PURE HYPERCHOLESTEROLEMIA: ICD-10-CM

## 2024-12-05 RX ORDER — PRAVASTATIN SODIUM 20 MG
20 TABLET ORAL DAILY
Qty: 90 TABLET | Refills: 0 | Status: SHIPPED | OUTPATIENT
Start: 2024-12-05

## 2024-12-05 NOTE — TELEPHONE ENCOUNTER
Amparo Richey called requesting a refill on the following medications:  Requested Prescriptions     Pending Prescriptions Disp Refills    pravastatin (PRAVACHOL) 20 MG tablet [Pharmacy Med Name: Pravastatin Sodium Oral Tablet 20 MG] 90 tablet 0     Sig: TAKE 1 TABLET BY MOUTH EVERY DAY       Date of last visit: 5/10/2024  Date of next visit (if applicable):12/9/2024  Date of last refill: 11/10/2023   Pharmacy Name: [facility]

## 2024-12-09 ENCOUNTER — OFFICE VISIT (OUTPATIENT)
Dept: FAMILY MEDICINE CLINIC | Age: 69
End: 2024-12-09

## 2024-12-09 ENCOUNTER — HOSPITAL ENCOUNTER (OUTPATIENT)
Dept: GENERAL RADIOLOGY | Age: 69
Discharge: HOME | End: 2024-12-09
Payer: MEDICARE

## 2024-12-09 ENCOUNTER — HOSPITAL ENCOUNTER (OUTPATIENT)
Age: 69
Discharge: HOME | End: 2024-12-09
Payer: MEDICARE

## 2024-12-09 VITALS
OXYGEN SATURATION: 95 % | HEART RATE: 100 BPM | RESPIRATION RATE: 18 BRPM | BODY MASS INDEX: 41.62 KG/M2 | WEIGHT: 281 LBS | HEIGHT: 69 IN | SYSTOLIC BLOOD PRESSURE: 132 MMHG | DIASTOLIC BLOOD PRESSURE: 68 MMHG

## 2024-12-09 DIAGNOSIS — M54.6 ACUTE RIGHT-SIDED THORACIC BACK PAIN: ICD-10-CM

## 2024-12-09 DIAGNOSIS — I10 PRIMARY HYPERTENSION: ICD-10-CM

## 2024-12-09 DIAGNOSIS — Z00.00 MEDICARE ANNUAL WELLNESS VISIT, SUBSEQUENT: Primary | ICD-10-CM

## 2024-12-09 DIAGNOSIS — R73.03 PREDIABETES: ICD-10-CM

## 2024-12-09 DIAGNOSIS — J45.30 MILD PERSISTENT ASTHMA WITHOUT COMPLICATION: ICD-10-CM

## 2024-12-09 DIAGNOSIS — R42 VERTIGO: ICD-10-CM

## 2024-12-09 DIAGNOSIS — E78.00 PURE HYPERCHOLESTEROLEMIA: ICD-10-CM

## 2024-12-09 PROCEDURE — 72072 X-RAY EXAM THORAC SPINE 3VWS: CPT

## 2024-12-09 RX ORDER — PRAVASTATIN SODIUM 20 MG
20 TABLET ORAL DAILY
Qty: 90 TABLET | Refills: 3 | Status: SHIPPED | OUTPATIENT
Start: 2024-12-09

## 2024-12-09 RX ORDER — BUDESONIDE AND FORMOTEROL FUMARATE DIHYDRATE 160; 4.5 UG/1; UG/1
2 AEROSOL RESPIRATORY (INHALATION) 2 TIMES DAILY
Qty: 30.6 G | Refills: 3 | Status: SHIPPED | OUTPATIENT
Start: 2024-12-09

## 2024-12-09 ASSESSMENT — LIFESTYLE VARIABLES
HOW MANY STANDARD DRINKS CONTAINING ALCOHOL DO YOU HAVE ON A TYPICAL DAY: PATIENT DOES NOT DRINK
HOW OFTEN DO YOU HAVE A DRINK CONTAINING ALCOHOL: NEVER

## 2024-12-09 ASSESSMENT — PATIENT HEALTH QUESTIONNAIRE - PHQ9
1. LITTLE INTEREST OR PLEASURE IN DOING THINGS: NOT AT ALL
SUM OF ALL RESPONSES TO PHQ QUESTIONS 1-9: 0
2. FEELING DOWN, DEPRESSED OR HOPELESS: NOT AT ALL
SUM OF ALL RESPONSES TO PHQ9 QUESTIONS 1 & 2: 0
SUM OF ALL RESPONSES TO PHQ QUESTIONS 1-9: 0

## 2024-12-09 NOTE — PROGRESS NOTES
tenderness that moves laterally but doesn't go anteriorly. No masses felt. No rib tenderness. No upper abdominal pain.            Allergies   Allergen Reactions    Cefdinir      rash    Amoxicillin Rash     Prior to Visit Medications    Medication Sig Taking? Authorizing Provider   budesonide-formoterol (SYMBICORT) 160-4.5 MCG/ACT AERO Inhale 2 puffs into the lungs 2 times daily BREYNA per insurance Yes Nanci Whiting MD   pravastatin (PRAVACHOL) 20 MG tablet Take 1 tablet by mouth daily Yes Nanci Whiting MD   lisinopril-hydroCHLOROthiazide (PRINZIDE;ZESTORETIC) 20-25 MG per tablet TAKE 1 TABLET DAILY Yes Nanci Whiting MD   loratadine (CLARITIN) 10 MG tablet Take 1 tablet by mouth daily Seasonal Yes Frederick Yepez MD   albuterol sulfate HFA (PROVENTIL;VENTOLIN;PROAIR) 108 (90 Base) MCG/ACT inhaler USE 2 INHALATIONS EVERY 6 HOURS AS NEEDED FOR WHEEZING OR SHORTNESS OF BREATH Yes Nanci Whiting MD   Multiple Vitamins-Minerals (WOMENS MULTI VITAMIN & MINERAL PO) Take 1 tablet by mouth daily. Yes Frederick Yepez MD   Calcium Polycarbophil (FIBERCON PO) Take 1 tablet by mouth 2 times daily. Yes Frederick Yepez MD   Omeprazole Magnesium (PRILOSEC OTC PO) Take 1 tablet by mouth daily. Yes Frederick Yepez MD       CareTeam (Including outside providers/suppliers regularly involved in providing care):   Patient Care Team:  Nanci Whiting MD as PCP - General (Family Medicine)  Nanci Whiting MD as PCP - Empaneled Provider      Reviewed and updated this visit:  Tobacco  Allergies  Meds  Problems  Med Hx  Surg Hx  Soc Hx  Fam Hx              The patient (or guardian, if applicable) and other individuals in attendance with the patient were advised that Artificial Intelligence will be utilized during this visit to record and process the conversation to generate a clinical note. The patient (or guardian, if applicable) and other individuals in attendance at the

## 2024-12-11 DIAGNOSIS — M54.6 ACUTE RIGHT-SIDED THORACIC BACK PAIN: Primary | ICD-10-CM

## 2024-12-13 ENCOUNTER — TELEPHONE (OUTPATIENT)
Dept: FAMILY MEDICINE CLINIC | Age: 69
End: 2024-12-13

## 2024-12-13 NOTE — TELEPHONE ENCOUNTER
----- Message from Dr. Nanci Whiting MD sent at 12/13/2024  2:36 PM EST -----  Please share mychart note below.  It looks like she has not reviewed. Thank you.      Thankfully, xray is negative for fractures or misalignment.  Osteophytes which are spurts are noted, along with some disc degeneration.   As we discussed, physical therapy is the next step.  We'll place order and they will call you soon.

## 2025-01-02 ENCOUNTER — HOSPITAL ENCOUNTER (OUTPATIENT)
Dept: PHYSICAL THERAPY | Age: 70
Setting detail: THERAPIES SERIES
Discharge: HOME | End: 2025-01-02
Payer: MEDICARE

## 2025-01-02 PROCEDURE — 97110 THERAPEUTIC EXERCISES: CPT

## 2025-01-02 PROCEDURE — 97162 PT EVAL MOD COMPLEX 30 MIN: CPT

## 2025-01-02 NOTE — PROGRESS NOTES
* PLEASE SIGN, DATE AND TIME CERTIFICATION BELOW AND RETURN TO [facility] OUTPATIENT REHABILITATION (FAX #: [phone]).  ATTEST/CO-SIGN IF ACCESSING VIA [employer].  THANK YOU.**    I certify that I have examined the patient below and determined that Physical Medicine and Rehabilitation service is necessary and that I approve the established plan of care for up to 90 days or as specifically noted.  Attestation, signature or co-signature of physician indicates approval of certification requirements.    ________________________ ____________  Physician Signature   Date   [facility]  PHYSICAL THERAPY  [x] EVALUATION  [] DAILY NOTE (LAND) [] DAILY NOTE (AQUATIC ) [] PROGRESS NOTE [] DISCHARGE NOTE    [] OUTPATIENT REHABILITATION CENTER [facility]   [x] [facility]    [] [facility]   [] [facility]    Date: 2025  Patient Name:  Amparo Richey  : 1955  MRN: [id number]  CSN: [id number]    Referring Practitioner Nanci Whiting MD [id number]      Diagnosis  Diagnoses       M54.6 (ICD-10-CM) - Pain in thoracic spine           Treatment Diagnosis M54.6  Thoracic Pain  M54.59  Other Low Back Pain  R29.3 Abnormal Posture  R53.1 Weakness   Date of Evaluation 25   Additional Pertinent History Amparo Richey has a past medical history of Allergic rhinitis, Asthma, Hypertension, and Irritable bowel syndrome.  she has a past surgical history that includes Hysterectomy (); Tonsillectomy and adenoidectomy; and US BREAST BIOPSY W LOC DEVICE 1ST LESION RIGHT (Right, 2011).    B TKR in past 10 years (, )   Allergies Allergies   Allergen Reactions    Cefdinir      rash    Amoxicillin Rash      Medications   Current Outpatient Medications:     budesonide-formoterol (SYMBICORT) 160-4.5 MCG/ACT AERO, Inhale 2 puffs into the lungs 2 times daily BREYNA per insurance, Disp: 30.6 g, Rfl: 3    pravastatin (PRAVACHOL) 20 MG tablet, Take 1 tablet by

## 2025-01-06 ENCOUNTER — HOSPITAL ENCOUNTER (OUTPATIENT)
Dept: PHYSICAL THERAPY | Age: 70
Setting detail: THERAPIES SERIES
End: 2025-01-06
Payer: MEDICARE

## 2025-01-08 ENCOUNTER — HOSPITAL ENCOUNTER (OUTPATIENT)
Dept: PHYSICAL THERAPY | Age: 70
Setting detail: THERAPIES SERIES
Discharge: HOME | End: 2025-01-08
Payer: MEDICARE

## 2025-01-08 PROCEDURE — 97032 APPL MODALITY 1+ESTIM EA 15: CPT

## 2025-01-08 PROCEDURE — 97110 THERAPEUTIC EXERCISES: CPT

## 2025-01-08 NOTE — PROGRESS NOTES
[facility]  PHYSICAL THERAPY  [] EVALUATION  [x] DAILY NOTE (LAND) [] DAILY NOTE (AQUATIC ) [] PROGRESS NOTE [] DISCHARGE NOTE    [] OUTPATIENT REHABILITATION CENTER [place]   [x] [place] [facility]    [] [facility]   [] [facility]    Date: 2025  Patient Name:  Amparo Richey  : 1955  MRN: [id number]  CSN: [id number]    Referring Practitioner Nanci Whiting MD [id number]      Diagnosis  Diagnoses       M54.6 (ICD-10-CM) - Pain in thoracic spine           Treatment Diagnosis M54.6  Thoracic Pain  M54.59  Other Low Back Pain  R29.3 Abnormal Posture  R53.1 Weakness   Date of Evaluation 25   Additional Pertinent History Amparo Richey has a past medical history of Allergic rhinitis, Asthma, Hypertension, and Irritable bowel syndrome.  she has a past surgical history that includes Hysterectomy (); Tonsillectomy and adenoidectomy; and US BREAST BIOPSY W LOC DEVICE 1ST LESION RIGHT (Right, 2011).    B TKR in past 10 years (, )   Allergies Allergies   Allergen Reactions    Cefdinir      rash    Amoxicillin Rash      Medications   Current Outpatient Medications:     budesonide-formoterol (SYMBICORT) 160-4.5 MCG/ACT AERO, Inhale 2 puffs into the lungs 2 times daily BREYNA per insurance, Disp: 30.6 g, Rfl: 3    pravastatin (PRAVACHOL) 20 MG tablet, Take 1 tablet by mouth daily, Disp: 90 tablet, Rfl: 3    lisinopril-hydroCHLOROthiazide (PRINZIDE;ZESTORETIC) 20-25 MG per tablet, TAKE 1 TABLET DAILY, Disp: 90 tablet, Rfl: 3    loratadine (CLARITIN) 10 MG tablet, Take 1 tablet by mouth daily Seasonal, Disp: , Rfl:     albuterol sulfate HFA (PROVENTIL;VENTOLIN;PROAIR) 108 (90 Base) MCG/ACT inhaler, USE 2 INHALATIONS EVERY 6 HOURS AS NEEDED FOR WHEEZING OR SHORTNESS OF BREATH, Disp: 25.5 g, Rfl: 3    Multiple Vitamins-Minerals (WOMENS MULTI VITAMIN & MINERAL PO), Take 1 tablet by mouth daily., Disp: , Rfl:     Calcium Polycarbophil

## 2025-01-13 ENCOUNTER — HOSPITAL ENCOUNTER (OUTPATIENT)
Dept: PHYSICAL THERAPY | Age: 70
Setting detail: THERAPIES SERIES
Discharge: HOME | End: 2025-01-13
Payer: MEDICARE

## 2025-01-13 PROCEDURE — 97110 THERAPEUTIC EXERCISES: CPT

## 2025-01-13 PROCEDURE — 97032 APPL MODALITY 1+ESTIM EA 15: CPT

## 2025-01-13 NOTE — PROGRESS NOTES
therapy. States completing HEP. Pain upon arrival is 0/10.    OBJECTIVE:    TREATMENT   Precautions: R wrist surgery October 2024- no restrictions currently   Pain: 7/10 T10-L3 when first getting up     \"X” in shaded column indicates activity completed today    “*\" next to exercise/intervention indicates progression   Modalities Parameters/  Location  Notes   ES IFC for pain control, thoracic/lumbar region, manual 13- 15.5 intensity, 15 min x Completed at end of session               Manual Therapy Time/Technique  Notes                     Exercise/Intervention   Notes   Abdominal bracing with bolster under knees  12x 5 seconds  x    SAQ alternating right/left 10 x 5 seconds  x    Quad set R then L, opposite knee bent, pillow R knee 10 x 5 seconds  x    Hip add with ball, abdominal bracing 10 x 5s  x    Bent knee fallout with abdominal bracing 10 x 3s  x    LTR  5 x 5-10s  x    SKTC* 5 x 10s  x With strap/gait belt          LAQ with abdominal bracing 5 x   x Right then left                   Specific Interventions Next Treatment: IFC for pain control- 2 channels jackelyn crossed thoracic/lumbar spine, modalities prn, DLSP, gentle stretching strengthening, Hawkgrips if needed if other IFC/US not helping, dry needling if needed    Activity/Treatment Tolerance:  [x]  Patient tolerated treatment well  []  Patient limited by fatigue  []  Patient limited by pain   []  Patient limited by medical complications  []  Other:     Assessment: Pt completed POC with some complains of increased pain/ache in back at end of session.  Completed ES after therex and pain decreased to 0/10.  Gave HEP HO of new exs.    Body Structures/Functions/Activity Limitations: impaired ROM, impaired strength, and pain  Prognosis: good    GOALS:  Patient Goal: to get my back pain to go away    Short Term Goals: deferred to LTG's    Long Term Goals: 6 weeks  Increase AROM B hip flexion to 70, lumbar flexion to 75%, extension to 50%, thoracic rotation to

## 2025-01-15 ENCOUNTER — HOSPITAL ENCOUNTER (OUTPATIENT)
Dept: PHYSICAL THERAPY | Age: 70
Setting detail: THERAPIES SERIES
Discharge: HOME | End: 2025-01-15
Payer: MEDICARE

## 2025-01-15 PROCEDURE — 97110 THERAPEUTIC EXERCISES: CPT

## 2025-01-15 PROCEDURE — 97032 APPL MODALITY 1+ESTIM EA 15: CPT

## 2025-01-15 NOTE — PROGRESS NOTES
[facility]  PHYSICAL THERAPY  [] EVALUATION  [x] DAILY NOTE (LAND) [] DAILY NOTE (AQUATIC ) [] PROGRESS NOTE [] DISCHARGE NOTE    [] OUTPATIENT REHABILITATION CENTER - [place]   [x] [place] [facility]    [] [facility]   [] [facility]    Date: 1/15/2025  Patient Name:  Amparo Ricehy  : 1955  MRN: [id number]  CSN: [id number]    Referring Practitioner Nanci Whiting MD [id number]      Diagnosis  Diagnoses       M54.6 (ICD-10-CM) - Pain in thoracic spine           Treatment Diagnosis M54.6  Thoracic Pain  M54.59  Other Low Back Pain  R29.3 Abnormal Posture  R53.1 Weakness   Date of Evaluation 25   Additional Pertinent History Amparo Richey has a past medical history of Allergic rhinitis, Asthma, Hypertension, and Irritable bowel syndrome.  she has a past surgical history that includes Hysterectomy (); Tonsillectomy and adenoidectomy; and US BREAST BIOPSY W LOC DEVICE 1ST LESION RIGHT (Right, 2011).    B TKR in past 10 years (, )   Allergies Allergies   Allergen Reactions    Cefdinir      rash    Amoxicillin Rash      Medications   Current Outpatient Medications:     budesonide-formoterol (SYMBICORT) 160-4.5 MCG/ACT AERO, Inhale 2 puffs into the lungs 2 times daily BREYNA per insurance, Disp: 30.6 g, Rfl: 3    pravastatin (PRAVACHOL) 20 MG tablet, Take 1 tablet by mouth daily, Disp: 90 tablet, Rfl: 3    lisinopril-hydroCHLOROthiazide (PRINZIDE;ZESTORETIC) 20-25 MG per tablet, TAKE 1 TABLET DAILY, Disp: 90 tablet, Rfl: 3    loratadine (CLARITIN) 10 MG tablet, Take 1 tablet by mouth daily Seasonal, Disp: , Rfl:     albuterol sulfate HFA (PROVENTIL;VENTOLIN;PROAIR) 108 (90 Base) MCG/ACT inhaler, USE 2 INHALATIONS EVERY 6 HOURS AS NEEDED FOR WHEEZING OR SHORTNESS OF BREATH, Disp: 25.5 g, Rfl: 3    Multiple Vitamins-Minerals (WOMENS MULTI VITAMIN & MINERAL PO), Take 1 tablet by mouth daily., Disp: , Rfl:     Calcium Polycarbophil

## 2025-01-20 ENCOUNTER — APPOINTMENT (OUTPATIENT)
Dept: PHYSICAL THERAPY | Age: 70
End: 2025-01-20
Payer: MEDICARE

## 2025-01-22 ENCOUNTER — HOSPITAL ENCOUNTER (OUTPATIENT)
Dept: PHYSICAL THERAPY | Age: 70
Setting detail: THERAPIES SERIES
Discharge: HOME | End: 2025-01-22
Payer: MEDICARE

## 2025-01-22 PROCEDURE — 97032 APPL MODALITY 1+ESTIM EA 15: CPT

## 2025-01-22 PROCEDURE — 97110 THERAPEUTIC EXERCISES: CPT

## 2025-01-22 NOTE — PROGRESS NOTES
[facility]  PHYSICAL THERAPY  [] EVALUATION  [x] DAILY NOTE (LAND) [] DAILY NOTE (AQUATIC ) [] PROGRESS NOTE [] DISCHARGE NOTE    [] OUTPATIENT REHABILITATION CENTER - [place]   [x] [place] [facility]    [] [facility]   [] [facility]    Date: 2025  Patient Name:  Amparo Richey  : 1955  MRN: [id number]  CSN: [id number]    Referring Practitioner Nanci Whiting MD [id number]      Diagnosis  Diagnoses       M54.6 (ICD-10-CM) - Pain in thoracic spine           Treatment Diagnosis M54.6  Thoracic Pain  M54.59  Other Low Back Pain  R29.3 Abnormal Posture  R53.1 Weakness   Date of Evaluation 25   Additional Pertinent History Amparo Richey has a past medical history of Allergic rhinitis, Asthma, Hypertension, and Irritable bowel syndrome.  she has a past surgical history that includes Hysterectomy (); Tonsillectomy and adenoidectomy; and US BREAST BIOPSY W LOC DEVICE 1ST LESION RIGHT (Right, 2011).    B TKR in past 10 years (, )   Allergies Allergies   Allergen Reactions    Cefdinir      rash    Amoxicillin Rash      Medications   Current Outpatient Medications:     budesonide-formoterol (SYMBICORT) 160-4.5 MCG/ACT AERO, Inhale 2 puffs into the lungs 2 times daily BREYNA per insurance, Disp: 30.6 g, Rfl: 3    pravastatin (PRAVACHOL) 20 MG tablet, Take 1 tablet by mouth daily, Disp: 90 tablet, Rfl: 3    lisinopril-hydroCHLOROthiazide (PRINZIDE;ZESTORETIC) 20-25 MG per tablet, TAKE 1 TABLET DAILY, Disp: 90 tablet, Rfl: 3    loratadine (CLARITIN) 10 MG tablet, Take 1 tablet by mouth daily Seasonal, Disp: , Rfl:     albuterol sulfate HFA (PROVENTIL;VENTOLIN;PROAIR) 108 (90 Base) MCG/ACT inhaler, USE 2 INHALATIONS EVERY 6 HOURS AS NEEDED FOR WHEEZING OR SHORTNESS OF BREATH, Disp: 25.5 g, Rfl: 3    Multiple Vitamins-Minerals (WOMENS MULTI VITAMIN & MINERAL PO), Take 1 tablet by mouth daily., Disp: , Rfl:     Calcium Polycarbophil

## 2025-01-24 ENCOUNTER — HOSPITAL ENCOUNTER (OUTPATIENT)
Dept: PHYSICAL THERAPY | Age: 70
Setting detail: THERAPIES SERIES
Discharge: HOME | End: 2025-01-24
Payer: MEDICARE

## 2025-01-24 PROCEDURE — 97110 THERAPEUTIC EXERCISES: CPT

## 2025-01-24 NOTE — PROGRESS NOTES
[facility]  PHYSICAL THERAPY  [] EVALUATION  [x] DAILY NOTE (LAND) [] DAILY NOTE (AQUATIC ) [] PROGRESS NOTE [] DISCHARGE NOTE    [] OUTPATIENT REHABILITATION CENTER [facility] [place]   [x] [place] [facility]    [] [facility]   [] [facility]    Date: 2025  Patient Name:  Amparo Richey  : 1955  MRN: [id number]  CSN: [id number]    Referring Practitioner Nanci Whiting MD [id number]      Diagnosis  Diagnoses       M54.6 (ICD-10-CM) - Pain in thoracic spine           Treatment Diagnosis M54.6  Thoracic Pain  M54.59  Other Low Back Pain  R29.3 Abnormal Posture  R53.1 Weakness   Date of Evaluation 25   Additional Pertinent History Amparo Richey has a past medical history of Allergic rhinitis, Asthma, Hypertension, and Irritable bowel syndrome.  she has a past surgical history that includes Hysterectomy (); Tonsillectomy and adenoidectomy; and US BREAST BIOPSY W LOC DEVICE 1ST LESION RIGHT (Right, 2011).    B TKR in past 10 years (, )   Allergies Allergies   Allergen Reactions    Cefdinir      rash    Amoxicillin Rash      Medications   Current Outpatient Medications:     budesonide-formoterol (SYMBICORT) 160-4.5 MCG/ACT AERO, Inhale 2 puffs into the lungs 2 times daily BREYNA per insurance, Disp: 30.6 g, Rfl: 3    pravastatin (PRAVACHOL) 20 MG tablet, Take 1 tablet by mouth daily, Disp: 90 tablet, Rfl: 3    lisinopril-hydroCHLOROthiazide (PRINZIDE;ZESTORETIC) 20-25 MG per tablet, TAKE 1 TABLET DAILY, Disp: 90 tablet, Rfl: 3    loratadine (CLARITIN) 10 MG tablet, Take 1 tablet by mouth daily Seasonal, Disp: , Rfl:     albuterol sulfate HFA (PROVENTIL;VENTOLIN;PROAIR) 108 (90 Base) MCG/ACT inhaler, USE 2 INHALATIONS EVERY 6 HOURS AS NEEDED FOR WHEEZING OR SHORTNESS OF BREATH, Disp: 25.5 g, Rfl: 3    Multiple Vitamins-Minerals (WOMENS MULTI VITAMIN & MINERAL PO), Take 1 tablet by mouth daily., Disp: , Rfl:     Calcium Polycarbophil

## 2025-01-27 ENCOUNTER — HOSPITAL ENCOUNTER (OUTPATIENT)
Dept: PHYSICAL THERAPY | Age: 70
Setting detail: THERAPIES SERIES
Discharge: HOME | End: 2025-01-27
Payer: MEDICARE

## 2025-01-27 PROCEDURE — 97110 THERAPEUTIC EXERCISES: CPT

## 2025-01-27 NOTE — PROGRESS NOTES
[facility]  PHYSICAL THERAPY  [] EVALUATION  [x] DAILY NOTE (LAND) [] DAILY NOTE (AQUATIC ) [] PROGRESS NOTE [] DISCHARGE NOTE    [] OUTPATIENT REHABILITATION CENTER [facility] [place]   [x] [place] [facility]    [facility]   [] [facility]    Date: 2025  Patient Name:  Amparo Richey  : 1955  MRN: [id number]  CSN: [id number]    Referring Practitioner Nanci Whiting MD [id number]      Diagnosis  Diagnoses       M54.6 (ICD-10-CM) - Pain in thoracic spine           Treatment Diagnosis M54.6  Thoracic Pain  M54.59  Other Low Back Pain  R29.3 Abnormal Posture  R53.1 Weakness   Date of Evaluation 25   Additional Pertinent History Amparo Richey has a past medical history of Allergic rhinitis, Asthma, Hypertension, and Irritable bowel syndrome.  she has a past surgical history that includes Hysterectomy (); Tonsillectomy and adenoidectomy; and US BREAST BIOPSY W LOC DEVICE 1ST LESION RIGHT (Right, 2011).    B TKR in past 10 years (, )   Allergies Allergies   Allergen Reactions    Cefdinir      rash    Amoxicillin Rash      Medications   Current Outpatient Medications:     budesonide-formoterol (SYMBICORT) 160-4.5 MCG/ACT AERO, Inhale 2 puffs into the lungs 2 times daily BREYNA per insurance, Disp: 30.6 g, Rfl: 3    pravastatin (PRAVACHOL) 20 MG tablet, Take 1 tablet by mouth daily, Disp: 90 tablet, Rfl: 3    lisinopril-hydroCHLOROthiazide (PRINZIDE;ZESTORETIC) 20-25 MG per tablet, TAKE 1 TABLET DAILY, Disp: 90 tablet, Rfl: 3    loratadine (CLARITIN) 10 MG tablet, Take 1 tablet by mouth daily Seasonal, Disp: , Rfl:     albuterol sulfate HFA (PROVENTIL;VENTOLIN;PROAIR) 108 (90 Base) MCG/ACT inhaler, USE 2 INHALATIONS EVERY 6 HOURS AS NEEDED FOR WHEEZING OR SHORTNESS OF BREATH, Disp: 25.5 g, Rfl: 3    Multiple Vitamins-Minerals (WOMENS MULTI VITAMIN & MINERAL PO), Take 1 tablet by mouth daily., Disp: , Rfl:     Calcium Polycarbophil

## 2025-01-29 ENCOUNTER — HOSPITAL ENCOUNTER (OUTPATIENT)
Dept: PHYSICAL THERAPY | Age: 70
Setting detail: THERAPIES SERIES
Discharge: HOME | End: 2025-01-29
Payer: MEDICARE

## 2025-01-29 PROCEDURE — 97110 THERAPEUTIC EXERCISES: CPT

## 2025-01-29 NOTE — DISCHARGE SUMMARY
[facility]  PHYSICAL THERAPY  [] EVALUATION  [] DAILY NOTE (LAND) [] DAILY NOTE (AQUATIC ) [] PROGRESS NOTE [x] DISCHARGE NOTE    [] OUTPATIENT REHABILITATION CENTER [facility] [place]   [x] [place] [facility]    [] [facility]   [] [facility]    Date: 2025  Patient Name:  Amparo Richey  : 1955  MRN: [id number]  CSN: [id number]    Referring Practitioner Nanci Whiting MD [id number]      Diagnosis  Diagnoses       M54.6 (ICD-10-CM) - Pain in thoracic spine           Treatment Diagnosis M54.6  Thoracic Pain  M54.59  Other Low Back Pain  R29.3 Abnormal Posture  R53.1 Weakness   Date of Evaluation 25   Additional Pertinent History Amparo Richey has a past medical history of Allergic rhinitis, Asthma, Hypertension, and Irritable bowel syndrome.  she has a past surgical history that includes Hysterectomy (); Tonsillectomy and adenoidectomy; and US BREAST BIOPSY W LOC DEVICE 1ST LESION RIGHT (Right, 2011).    B TKR in past 10 years (, )   Allergies Allergies   Allergen Reactions    Cefdinir      rash    Amoxicillin Rash      Medications   Current Outpatient Medications:     budesonide-formoterol (SYMBICORT) 160-4.5 MCG/ACT AERO, Inhale 2 puffs into the lungs 2 times daily BREYNA per insurance, Disp: 30.6 g, Rfl: 3    pravastatin (PRAVACHOL) 20 MG tablet, Take 1 tablet by mouth daily, Disp: 90 tablet, Rfl: 3    lisinopril-hydroCHLOROthiazide (PRINZIDE;ZESTORETIC) 20-25 MG per tablet, TAKE 1 TABLET DAILY, Disp: 90 tablet, Rfl: 3    loratadine (CLARITIN) 10 MG tablet, Take 1 tablet by mouth daily Seasonal, Disp: , Rfl:     albuterol sulfate HFA (PROVENTIL;VENTOLIN;PROAIR) 108 (90 Base) MCG/ACT inhaler, USE 2 INHALATIONS EVERY 6 HOURS AS NEEDED FOR WHEEZING OR SHORTNESS OF BREATH, Disp: 25.5 g, Rfl: 3    Multiple Vitamins-Minerals (WOMENS MULTI VITAMIN & MINERAL PO), Take 1 tablet by mouth daily., Disp: , Rfl:     Calcium Polycarbophil

## 2025-04-30 DIAGNOSIS — I10 PRIMARY HYPERTENSION: ICD-10-CM

## 2025-04-30 RX ORDER — LISINOPRIL AND HYDROCHLOROTHIAZIDE 20; 25 MG/1; MG/1
1 TABLET ORAL DAILY
Qty: 90 TABLET | Refills: 0 | Status: SHIPPED | OUTPATIENT
Start: 2025-04-30

## 2025-04-30 NOTE — TELEPHONE ENCOUNTER
This medication refill is regarding a electronic request. Refill requested by  [facility] .    Requested Prescriptions     Pending Prescriptions Disp Refills    lisinopril-hydroCHLOROthiazide (PRINZIDE;ZESTORETIC) 20-25 MG per tablet [Pharmacy Med Name: Lisinopril-hydroCHLOROthiazide Oral Tablet 20-25 MG] 90 tablet 0     Sig: TAKE 1 TABLET BY MOUTH EVERY DAY       Date of last visit: 12/9/2024   Date of next visit: 6/18/2025  Date of last refill: 5/10/2024  90/3    Last Lipid Panel:    Lab Results   Component Value Date/Time    CHOL 185 08/30/2024 01:24 PM    TRIG 186 08/30/2024 01:24 PM    HDL 51 08/30/2024 01:24 PM     Last CMP:   Lab Results   Component Value Date     10/14/2024    K 3.9 10/14/2024     10/14/2024    CO2 25 10/14/2024    BUN 20 10/14/2024    CREATININE 1.0 10/14/2024    GLUCOSE 111 10/14/2024    CALCIUM 9.4 05/03/2024    BILITOT 0.25 08/30/2024    ALKPHOS 99 08/30/2024    AST 18 08/30/2024    ALT 10 08/30/2024    LABGLOM 69 05/03/2024    AGRATIO 1.5 09/28/2019       Last Thyroid:    Lab Results   Component Value Date    TSH 2.370 05/03/2024     Last Hemoglobin A1C:    Lab Results   Component Value Date/Time    LABA1C 5.7 05/03/2024 09:18 AM       Rx verified, ordered and set to EP.

## 2025-06-04 ENCOUNTER — TRANSCRIBE ORDERS (OUTPATIENT)
Dept: ADMINISTRATIVE | Age: 70
End: 2025-06-04

## 2025-06-04 DIAGNOSIS — Z12.31 OTHER SCREENING MAMMOGRAM: Primary | ICD-10-CM

## 2025-06-10 ENCOUNTER — HOSPITAL ENCOUNTER (OUTPATIENT)
Age: 70
Discharge: HOME | End: 2025-06-10
Payer: MEDICARE

## 2025-06-10 ENCOUNTER — RESULTS FOLLOW-UP (OUTPATIENT)
Dept: FAMILY MEDICINE CLINIC | Age: 70
End: 2025-06-10

## 2025-06-10 ENCOUNTER — HOSPITAL ENCOUNTER (OUTPATIENT)
Dept: WOMENS IMAGING | Age: 70
Discharge: HOME | End: 2025-06-10
Payer: MEDICARE

## 2025-06-10 VITALS — HEIGHT: 68 IN | BODY MASS INDEX: 44.41 KG/M2 | WEIGHT: 293 LBS

## 2025-06-10 DIAGNOSIS — Z12.31 OTHER SCREENING MAMMOGRAM: ICD-10-CM

## 2025-06-10 DIAGNOSIS — I10 PRIMARY HYPERTENSION: ICD-10-CM

## 2025-06-10 DIAGNOSIS — R73.03 PREDIABETES: ICD-10-CM

## 2025-06-10 LAB
ALBUMIN SERPL BCG-MCNC: 3.8 G/DL (ref 3.4–4.9)
ALP SERPL-CCNC: 90 U/L (ref 38–126)
ALT SERPL W/O P-5'-P-CCNC: 12 U/L (ref 10–35)
ANION GAP SERPL CALC-SCNC: 10 MEQ/L (ref 8–16)
AST SERPL-CCNC: 18 U/L (ref 10–35)
BILIRUB CONJ SERPL-MCNC: 0.2 MG/DL (ref 0–0.2)
BILIRUB SERPL-MCNC: 0.4 MG/DL (ref 0.3–1.2)
BUN SERPL-MCNC: 21 MG/DL (ref 8–23)
CALCIUM SERPL-MCNC: 9.3 MG/DL (ref 8.8–10.2)
CHLORIDE SERPL-SCNC: 104 MEQ/L (ref 98–111)
CHOLEST SERPL-MCNC: 153 MG/DL (ref 100–199)
CO2 SERPL-SCNC: 27 MEQ/L (ref 22–29)
CREAT SERPL-MCNC: 0.9 MG/DL (ref 0.5–0.9)
CREAT UR-MCNC: 116 MG/DL
DEPRECATED MEAN GLUCOSE BLD GHB EST-ACNC: 117 MG/DL (ref 70–126)
GFR SERPL CREATININE-BSD FRML MDRD: 69 ML/MIN/1.73M2
GLUCOSE SERPL-MCNC: 98 MG/DL (ref 74–109)
HBA1C MFR BLD HPLC: 5.9 % (ref 4–6)
HDLC SERPL-MCNC: 46 MG/DL
LDLC SERPL CALC-MCNC: 91 MG/DL
MICROALBUMIN UR-MCNC: < 2 MG/DL
MICROALBUMIN/CREAT RATIO PNL UR: 17 MG/G (ref 0–30)
POTASSIUM SERPL-SCNC: 4.3 MEQ/L (ref 3.5–5.2)
PROT SERPL-MCNC: 6.6 G/DL (ref 6.4–8.3)
SODIUM SERPL-SCNC: 141 MEQ/L (ref 135–145)
TRIGL SERPL-MCNC: 78 MG/DL (ref 0–199)
TSH SERPL DL<=0.05 MIU/L-ACNC: 2.7 UIU/ML (ref 0.27–4.2)

## 2025-06-10 PROCEDURE — 36415 COLL VENOUS BLD VENIPUNCTURE: CPT

## 2025-06-10 PROCEDURE — 82043 UR ALBUMIN QUANTITATIVE: CPT

## 2025-06-10 PROCEDURE — 84443 ASSAY THYROID STIM HORMONE: CPT

## 2025-06-10 PROCEDURE — 80053 COMPREHEN METABOLIC PANEL: CPT

## 2025-06-10 PROCEDURE — 80061 LIPID PANEL: CPT

## 2025-06-10 PROCEDURE — 77063 BREAST TOMOSYNTHESIS BI: CPT

## 2025-06-10 PROCEDURE — 83036 HEMOGLOBIN GLYCOSYLATED A1C: CPT

## 2025-06-10 PROCEDURE — 82248 BILIRUBIN DIRECT: CPT

## 2025-06-29 SDOH — HEALTH STABILITY: PHYSICAL HEALTH: ON AVERAGE, HOW MANY DAYS PER WEEK DO YOU ENGAGE IN MODERATE TO STRENUOUS EXERCISE (LIKE A BRISK WALK)?: 0 DAYS

## 2025-06-29 ASSESSMENT — PATIENT HEALTH QUESTIONNAIRE - PHQ9
SUM OF ALL RESPONSES TO PHQ QUESTIONS 1-9: 0
2. FEELING DOWN, DEPRESSED OR HOPELESS: NOT AT ALL
1. LITTLE INTEREST OR PLEASURE IN DOING THINGS: NOT AT ALL
SUM OF ALL RESPONSES TO PHQ QUESTIONS 1-9: 0

## 2025-06-29 ASSESSMENT — LIFESTYLE VARIABLES
HOW OFTEN DO YOU HAVE A DRINK CONTAINING ALCOHOL: NEVER
HOW OFTEN DO YOU HAVE A DRINK CONTAINING ALCOHOL: 1
HOW MANY STANDARD DRINKS CONTAINING ALCOHOL DO YOU HAVE ON A TYPICAL DAY: 0
HOW OFTEN DO YOU HAVE SIX OR MORE DRINKS ON ONE OCCASION: 1
HOW MANY STANDARD DRINKS CONTAINING ALCOHOL DO YOU HAVE ON A TYPICAL DAY: PATIENT DOES NOT DRINK

## 2025-06-30 ENCOUNTER — OFFICE VISIT (OUTPATIENT)
Dept: FAMILY MEDICINE CLINIC | Age: 70
End: 2025-06-30

## 2025-06-30 VITALS
BODY MASS INDEX: 42.5 KG/M2 | TEMPERATURE: 97.2 F | OXYGEN SATURATION: 94 % | RESPIRATION RATE: 18 BRPM | DIASTOLIC BLOOD PRESSURE: 70 MMHG | SYSTOLIC BLOOD PRESSURE: 132 MMHG | WEIGHT: 280.4 LBS | HEART RATE: 77 BPM | HEIGHT: 68 IN

## 2025-06-30 DIAGNOSIS — D22.9 NEVUS: ICD-10-CM

## 2025-06-30 DIAGNOSIS — Z00.00 MEDICARE ANNUAL WELLNESS VISIT, SUBSEQUENT: Primary | ICD-10-CM

## 2025-06-30 DIAGNOSIS — I10 PRIMARY HYPERTENSION: ICD-10-CM

## 2025-06-30 DIAGNOSIS — E78.00 PURE HYPERCHOLESTEROLEMIA: ICD-10-CM

## 2025-06-30 PROBLEM — M25.561 CHRONIC PAIN OF RIGHT KNEE: Status: RESOLVED | Noted: 2021-09-30 | Resolved: 2025-06-30

## 2025-06-30 PROBLEM — G89.29 CHRONIC PAIN OF RIGHT KNEE: Status: RESOLVED | Noted: 2021-09-30 | Resolved: 2025-06-30

## 2025-06-30 PROBLEM — M17.11 PRIMARY OSTEOARTHRITIS OF RIGHT KNEE: Status: RESOLVED | Noted: 2021-09-30 | Resolved: 2025-06-30

## 2025-06-30 RX ORDER — PRAVASTATIN SODIUM 20 MG
20 TABLET ORAL DAILY
Qty: 90 TABLET | Refills: 3 | Status: SHIPPED | OUTPATIENT
Start: 2025-06-30

## 2025-06-30 RX ORDER — LISINOPRIL AND HYDROCHLOROTHIAZIDE 20; 25 MG/1; MG/1
1 TABLET ORAL DAILY
Qty: 90 TABLET | Refills: 3 | Status: SHIPPED | OUTPATIENT
Start: 2025-06-30

## 2025-06-30 ASSESSMENT — PATIENT HEALTH QUESTIONNAIRE - PHQ9
SUM OF ALL RESPONSES TO PHQ QUESTIONS 1-9: 0
2. FEELING DOWN, DEPRESSED OR HOPELESS: NOT AT ALL
SUM OF ALL RESPONSES TO PHQ QUESTIONS 1-9: 0
1. LITTLE INTEREST OR PLEASURE IN DOING THINGS: NOT AT ALL
SUM OF ALL RESPONSES TO PHQ QUESTIONS 1-9: 0
SUM OF ALL RESPONSES TO PHQ QUESTIONS 1-9: 0

## 2025-06-30 NOTE — PATIENT INSTRUCTIONS
These may include:    Chest pain or pressure, or a strange feeling in the chest.     Sweating.     Shortness of breath.     Pain, pressure, or a strange feeling in the back, neck, jaw, or upper belly or in one or both shoulders or arms.     Lightheadedness or sudden weakness.     A fast or irregular heartbeat.   After you call 911, the  may tell you to chew 1 adult-strength or 2 to 4 low-dose aspirin. Wait for an ambulance. Do not try to drive yourself.  Watch closely for changes in your health, and be sure to contact your doctor if you have any problems.  Where can you learn more?  Go to https://www.[employer].net/patientEd and enter F075 to learn more about \"A Healthy Heart: Care Instructions.\"  Current as of: July 31, 2024  Content Version: 14.5  © 7659-6657 [employer].   Care instructions adapted under license by [employer]. If you have questions about a medical condition or this instruction, always ask your healthcare professional. [employer] disclaims any warranty or liability for your use of this information.    Personalized Preventive Plan for Amparo Richey - 6/30/2025  Medicare offers a range of preventive health benefits. Some of the tests and screenings are paid in full while other may be subject to a deductible, co-insurance, and/or copay.  Some of these benefits include a comprehensive review of your medical history including lifestyle, illnesses that may run in your family, and various assessments and screenings as appropriate.  After reviewing your medical record and screening and assessments performed today your provider may have ordered immunizations, labs, imaging, and/or referrals for you.  A list of these orders (if applicable) as well as your Preventive Care list are included within your After Visit Summary for your review.

## 2025-06-30 NOTE — PROGRESS NOTES
Medicare Annual Wellness Visit    Amparo Richey is here for Medicare AWV (Pt denies any concerns today. )    Assessment & Plan  1. Annual wellness visit.  - Blood pressure readings are within the normal range at 132/70.  - Recent blood work results are satisfactory; physical examination today did not reveal any abnormalities.  - Advised to continue efforts towards maintaining a healthy diet and incorporating more physical activity into her routine.  - Prescriptions for lisinopril and pravastatin will be renewed.    2. Facial spots.  - Facial spots are likely age-related.  - Referral to a dermatologist will be made for further evaluation and potential removal of the spots.  - Advised to monitor the spots for any changes in size or appearance.    Follow-up  - Follow-up in 6 months.    Medicare annual wellness visit, subsequent  Results         Return in 1 year (on 6/30/2026) for Medicare AWV.     Subjective     History of Present Illness  The patient is a 70-year-old female who presents today for an annual wellness visit.    She reports no current health concerns. She has been making efforts to maintain a healthy diet, including the consumption of fruits and vegetables. However, her physical activity is limited. She has completed her mammogram screening.     In October 2024, [place], resulting in a wrist injury that required surgical intervention to correct a dislocated bone. Since then, she has not had any further falls and has taken measures to prevent tripping hazards at home.     She does not experience chest pain, palpitations, or shortness of breath. She is seeking refills for her lisinopril and pravastatin prescriptions. Her supply of inhalers is sufficient at present.    She has noticed a few spots on her face that have been present for a significant period of time without any changes. These spots do not cause her any discomfort.    PAST SURGICAL HISTORY:  Wrist surgery in